# Patient Record
Sex: FEMALE | Race: WHITE | NOT HISPANIC OR LATINO | Employment: UNEMPLOYED | URBAN - METROPOLITAN AREA
[De-identification: names, ages, dates, MRNs, and addresses within clinical notes are randomized per-mention and may not be internally consistent; named-entity substitution may affect disease eponyms.]

---

## 2017-01-23 ENCOUNTER — TELEPHONE (OUTPATIENT)
Dept: FAMILY MEDICINE | Facility: CLINIC | Age: 62
End: 2017-01-23

## 2017-01-23 NOTE — TELEPHONE ENCOUNTER
I don't thinks we should use an unavailable slot for this problem, I can see her when I have an available appointment

## 2017-01-23 NOTE — TELEPHONE ENCOUNTER
Reason for Call:  Other call back    Detailed comments:  Viktor baltazar, had discussed patient getting a power wheelchair for patient at previous appointment would like to speak with Dr Espinal    Phone Number Patient can be reached at: Home number on file [phone] (home)    Best Time: any    Can we leave a detailed message on this number? YES    Call taken on 1/23/2017 at 8:49 AM by Marlin Zacarias

## 2017-01-23 NOTE — TELEPHONE ENCOUNTER
Call back to spouse, Viktor. C2C on file.  Viktor states he contacted Anabela regarding power chair for patient.  Viktor states would need an OV along with order for chair.  Per Viktor, states he has discussed this with provider.  Viktor states he will be in town with patient from 1/27-2/8 if provider needs to see patient.  Informed would send to provider to advise on order and OV.  Provider please review. Are you able to see patient during dates above to assess for power chair?  If so, please advise when and any other actions needed.  Patricia Burton RN

## 2017-03-24 ENCOUNTER — TELEPHONE (OUTPATIENT)
Dept: FAMILY MEDICINE | Facility: CLINIC | Age: 62
End: 2017-03-24

## 2017-03-24 NOTE — TELEPHONE ENCOUNTER
Reason for Call:  Other appointment    Detailed comments:   Viktor calling, patient fell and hit her head and lost consciousness last night  was in ER at [facility]. SHe is home now and  is supposed to be seen today per ER.     Please advise.    Phone Number Patient can be reached at: Other phone number:  [phone] Viktor    Best Time: asap    Can we leave a detailed message on this number? YES    Call taken on 3/24/2017 at 8:56 AM by Veda Huston

## 2017-03-24 NOTE — TELEPHONE ENCOUNTER
"PCP: XENA    Last evening Patient was going to sit down at the kitchen table chair that has rollers on it, she missed the chair and \"flopped to the side\" and hit her head on the baseboard near the floor.  Was knocked unconscious, taken by ambulance to [facility].  CT scan and \"heart\" testing done. Everything came back normal.  Her blood sugar was noted to be low at 60, and her sodium was low (number unknown)   The  states that she got an abrasion on the right temple area \"that bled a lot\", no stitches needed.  States that she has a big \"knob\" on her head.    States that no IV fluids were given, no other treatment done.     The  states that his wife is doing very well. \"looks well.\"    He states that the ER wanted to keep her overnight, but the Patient wanted to go home.  They were advised to be seen today. \"I don't think she needs to be seen today, she looks great.\"     The  states that she had eaten well for dinner last night.     Plan: Scheduled with PCP for Monday 3/27.  Advised to have Patient seen in ER this weekend if symptoms worsen or change.  Advised to call the nurseline over the weekend if they have any other questions or concerns.  The  is comfortable with this plan.     Glenny Juan RN  "

## 2017-03-24 NOTE — TELEPHONE ENCOUNTER
To PCP:  Please see below.  Can you see patient?  We have no other openings today.  Thank you.  Megan Dooley RN

## 2017-03-24 NOTE — TELEPHONE ENCOUNTER
If she feels well she can be seen here on Monday or sometime next wee, if she feels badly or Jabier is concerned then returning to ER is appropriate

## 2017-08-03 DIAGNOSIS — E03.9 HYPOTHYROIDISM, UNSPECIFIED TYPE: ICD-10-CM

## 2017-08-03 RX ORDER — LEVOTHYROXINE SODIUM 25 UG/1
TABLET ORAL
Qty: 90 TABLET | Refills: 0 | Status: SHIPPED | OUTPATIENT
Start: 2017-08-03 | End: 2017-10-04

## 2017-08-03 NOTE — TELEPHONE ENCOUNTER
Routing refill request to provider for review/approval because:  Labs not current:  TSH  Please review refill.  Thank you.  Megan Dooley RN

## 2017-08-03 NOTE — TELEPHONE ENCOUNTER
levothyroxine (SYNTHROID/LEVOTHROID) 25 MCG tablet       Last Written Prescription Date: 1/5/2017  Last Quantity: 90, # refills: 1  Last Office Visit with [facility], [facility] or [facility] prescribing provider: 12/2/2016        TSH   Date Value Ref Range Status   03/25/2016 1.35 0.40 - 4.00 mU/L Final

## 2017-09-03 ENCOUNTER — HEALTH MAINTENANCE LETTER (OUTPATIENT)
Age: 62
End: 2017-09-03

## 2017-10-04 ENCOUNTER — TELEPHONE (OUTPATIENT)
Dept: FAMILY MEDICINE | Facility: CLINIC | Age: 62
End: 2017-10-04

## 2017-10-04 DIAGNOSIS — E87.1 HYPONATREMIA: ICD-10-CM

## 2017-10-04 DIAGNOSIS — E03.9 HYPOTHYROIDISM, UNSPECIFIED TYPE: ICD-10-CM

## 2017-10-04 RX ORDER — LEVOTHYROXINE SODIUM 25 UG/1
TABLET ORAL
Qty: 90 TABLET | Refills: 0 | Status: SHIPPED | OUTPATIENT
Start: 2017-10-04 | End: 2018-03-06

## 2017-10-04 RX ORDER — SODIUM CHLORIDE 1 G/1
TABLET ORAL
Qty: 270 TABLET | Refills: 0 | Status: SHIPPED | OUTPATIENT
Start: 2017-10-04 | End: 2017-12-26

## 2017-10-04 NOTE — TELEPHONE ENCOUNTER
sodium chloride 1 GM tablet     Last Written Prescription Date: 3/20/2017  Last Fill Quantity: 270,  # refills: 3   Last Office Visit with [facility], [facility] or [facility] prescribing provider: 12/2/2016                                             levothyroxine (SYNTHROID/LEVOTHROID) 25 MCG tablet      Last Written Prescription Date: 8/3/2017  Last Quantity: 90, # refills: 0  Last Office Visit with [facility], [facility] or [facility] prescribing provider: 12/2/2016        TSH   Date Value Ref Range Status   03/25/2016 1.35 0.40 - 4.00 mU/L Final

## 2017-10-04 NOTE — TELEPHONE ENCOUNTER
Routing refill request to provider for review/approval because:  Labs not current:  TSH.  Please review refills.  Thank you.  Megan Dooley RN

## 2017-10-04 NOTE — TELEPHONE ENCOUNTER
PHS (Pap/Colon/Mammo) - Completed. Per patient, completed all screenings with normal results. Patient does not remember when they were completed.    Outreach ,  Flora Barnard

## 2017-12-26 DIAGNOSIS — E87.1 HYPONATREMIA: ICD-10-CM

## 2017-12-28 RX ORDER — SODIUM CHLORIDE 1 G/1
TABLET ORAL
Qty: 270 TABLET | Refills: 0 | Status: SHIPPED | OUTPATIENT
Start: 2017-12-28

## 2017-12-28 NOTE — TELEPHONE ENCOUNTER
Routing refill request to provider for review/approval because:  Drug not on the [facility] refill protocol   Last ov 12/2/16 - due for OV  Erma Farley RN        Requested Prescriptions   Pending Prescriptions Disp Refills     sodium chloride 1 GM tablet [Pharmacy Med Name: SODIUM CHLORIDE 1GM TABLETS] 270 tablet 0     Sig: TAKE 1 TABLET BY MOUTH THREE TIMES DAILY TO REPLACE SALT    There is no refill protocol information for this order

## 2018-01-02 ENCOUNTER — AMBULATORY - HEALTHEAST (OUTPATIENT)
Dept: GERIATRICS | Facility: CLINIC | Age: 63
End: 2018-01-02

## 2018-01-03 ENCOUNTER — OFFICE VISIT - HEALTHEAST (OUTPATIENT)
Dept: GERIATRICS | Facility: CLINIC | Age: 63
End: 2018-01-03

## 2018-01-03 DIAGNOSIS — Z98.890 S/P ORIF (OPEN REDUCTION INTERNAL FIXATION) FRACTURE: ICD-10-CM

## 2018-01-03 DIAGNOSIS — Z87.81 S/P ORIF (OPEN REDUCTION INTERNAL FIXATION) FRACTURE: ICD-10-CM
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
## 2018-01-03 DIAGNOSIS — E87.6 HYPOKALEMIA: ICD-10-CM

## 2018-01-03 DIAGNOSIS — I10 ESSENTIAL HYPERTENSION: ICD-10-CM

## 2018-01-03 DIAGNOSIS — K59.03 DRUG-INDUCED CONSTIPATION: ICD-10-CM

## 2018-01-03 DIAGNOSIS — E03.9 ACQUIRED HYPOTHYROIDISM: ICD-10-CM

## 2018-01-03 DIAGNOSIS — E85.4 CEREBRAL AMYLOID ANGIOPATHY (H): ICD-10-CM

## 2018-01-03 DIAGNOSIS — R63.0 ANOREXIA: ICD-10-CM

## 2018-01-03 DIAGNOSIS — E87.1 HYPONATREMIA: ICD-10-CM

## 2018-01-03 DIAGNOSIS — E44.0 MODERATE PROTEIN-CALORIE MALNUTRITION (H): ICD-10-CM

## 2018-01-03 DIAGNOSIS — E83.42 HYPOMAGNESEMIA: ICD-10-CM

## 2018-01-03 DIAGNOSIS — S42.402D CLOSED FRACTURE OF LEFT ELBOW WITH ROUTINE HEALING: ICD-10-CM

## 2018-01-03 DIAGNOSIS — Z86.73 H/O: CVA (CEREBROVASCULAR ACCIDENT): ICD-10-CM

## 2018-01-03 DIAGNOSIS — I68.0 CEREBRAL AMYLOID ANGIOPATHY (H): ICD-10-CM

## 2018-01-03 DIAGNOSIS — S42.002D CLOSED DISPLACED FRACTURE OF LEFT CLAVICLE WITH ROUTINE HEALING, UNSPECIFIED PART OF CLAVICLE, SUBSEQUENT ENCOUNTER: ICD-10-CM

## 2018-01-04 ENCOUNTER — RECORDS - HEALTHEAST (OUTPATIENT)
Dept: LAB | Facility: CLINIC | Age: 63
End: 2018-01-04

## 2018-01-04 ENCOUNTER — OFFICE VISIT - HEALTHEAST (OUTPATIENT)
Dept: GERIATRICS | Facility: CLINIC | Age: 63
End: 2018-01-04

## 2018-01-04 DIAGNOSIS — Z87.81 S/P ORIF (OPEN REDUCTION INTERNAL FIXATION) FRACTURE: ICD-10-CM

## 2018-01-04 DIAGNOSIS — M81.0 OSTEOPOROSIS: ICD-10-CM

## 2018-01-04 DIAGNOSIS — S42.002D CLOSED DISPLACED FRACTURE OF LEFT CLAVICLE WITH ROUTINE HEALING, UNSPECIFIED PART OF CLAVICLE, SUBSEQUENT ENCOUNTER: ICD-10-CM

## 2018-01-04 DIAGNOSIS — Z86.73 H/O: CVA (CEREBROVASCULAR ACCIDENT): ICD-10-CM

## 2018-01-04 DIAGNOSIS — Z98.890 S/P ORIF (OPEN REDUCTION INTERNAL FIXATION) FRACTURE: ICD-10-CM

## 2018-01-04 DIAGNOSIS — I10 ESSENTIAL HYPERTENSION: ICD-10-CM

## 2018-01-04 DIAGNOSIS — S42.402D CLOSED FRACTURE OF LEFT ELBOW WITH ROUTINE HEALING: ICD-10-CM

## 2018-01-04 DIAGNOSIS — E87.1 HYPONATREMIA: ICD-10-CM

## 2018-01-04 LAB
ANION GAP SERPL CALCULATED.3IONS-SCNC: 6 MMOL/L (ref 5–18)
BASOPHILS # BLD AUTO: 0 THOU/UL (ref 0–0.2)
BASOPHILS NFR BLD AUTO: 1 % (ref 0–2)
BUN SERPL-MCNC: 12 MG/DL (ref 8–22)
CALCIUM SERPL-MCNC: 8.5 MG/DL (ref 8.5–10.5)
CHLORIDE BLD-SCNC: 101 MMOL/L (ref 98–107)
CO2 SERPL-SCNC: 24 MMOL/L (ref 22–31)
CREAT SERPL-MCNC: 0.5 MG/DL (ref 0.6–1.1)
EOSINOPHIL # BLD AUTO: 0.1 THOU/UL (ref 0–0.4)
EOSINOPHIL NFR BLD AUTO: 1 % (ref 0–6)
ERYTHROCYTE [DISTWIDTH] IN BLOOD BY AUTOMATED COUNT: 13.6 % (ref 11–14.5)
GFR SERPL CREATININE-BSD FRML MDRD: >60 ML/MIN/1.73M2
GLUCOSE BLD-MCNC: 86 MG/DL (ref 70–125)
HCT VFR BLD AUTO: 35.9 % (ref 35–47)
HGB BLD-MCNC: 11.9 G/DL (ref 12–16)
LYMPHOCYTES # BLD AUTO: 1 THOU/UL (ref 0.8–4.4)
LYMPHOCYTES NFR BLD AUTO: 11 % (ref 20–40)
MAGNESIUM SERPL-MCNC: 1.9 MG/DL (ref 1.8–2.6)
MCH RBC QN AUTO: 31.9 PG (ref 27–34)
MCHC RBC AUTO-ENTMCNC: 33.1 G/DL (ref 32–36)
MCV RBC AUTO: 96 FL (ref 80–100)
MONOCYTES # BLD AUTO: 0.7 THOU/UL (ref 0–0.9)
MONOCYTES NFR BLD AUTO: 8 % (ref 2–10)
NEUTROPHILS # BLD AUTO: 6.4 THOU/UL (ref 2–7.7)
NEUTROPHILS NFR BLD AUTO: 79 % (ref 50–70)
PLATELET # BLD AUTO: 431 THOU/UL (ref 140–440)
PMV BLD AUTO: 10 FL (ref 8.5–12.5)
POTASSIUM BLD-SCNC: 4.4 MMOL/L (ref 3.5–5)
RBC # BLD AUTO: 3.73 MILL/UL (ref 3.8–5.4)
SODIUM SERPL-SCNC: 131 MMOL/L (ref 136–145)
WBC: 8.5 THOU/UL (ref 4–11)

## 2018-01-05 ENCOUNTER — AMBULATORY - HEALTHEAST (OUTPATIENT)
Dept: ADMINISTRATIVE | Facility: CLINIC | Age: 63
End: 2018-01-05

## 2018-01-09 ENCOUNTER — OFFICE VISIT - HEALTHEAST (OUTPATIENT)
Dept: GERIATRICS | Facility: CLINIC | Age: 63
End: 2018-01-09

## 2018-01-09 ENCOUNTER — RECORDS - HEALTHEAST (OUTPATIENT)
Dept: LAB | Facility: CLINIC | Age: 63
End: 2018-01-09

## 2018-01-09 DIAGNOSIS — S42.402D CLOSED FRACTURE OF LEFT ELBOW WITH ROUTINE HEALING: ICD-10-CM

## 2018-01-09 DIAGNOSIS — I10 ESSENTIAL HYPERTENSION: ICD-10-CM

## 2018-01-09 DIAGNOSIS — E87.1 HYPONATREMIA: ICD-10-CM

## 2018-01-09 DIAGNOSIS — I68.0 CEREBRAL AMYLOID ANGIOPATHY (H): ICD-10-CM

## 2018-01-09 DIAGNOSIS — R63.0 ANOREXIA: ICD-10-CM
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
## 2018-01-09 DIAGNOSIS — E44.0 MODERATE PROTEIN-CALORIE MALNUTRITION (H): ICD-10-CM

## 2018-01-09 DIAGNOSIS — E03.9 ACQUIRED HYPOTHYROIDISM: ICD-10-CM

## 2018-01-09 DIAGNOSIS — E85.4 CEREBRAL AMYLOID ANGIOPATHY (H): ICD-10-CM

## 2018-01-09 DIAGNOSIS — E83.42 HYPOMAGNESEMIA: ICD-10-CM

## 2018-01-09 DIAGNOSIS — S42.002D CLOSED DISPLACED FRACTURE OF LEFT CLAVICLE WITH ROUTINE HEALING, UNSPECIFIED PART OF CLAVICLE, SUBSEQUENT ENCOUNTER: ICD-10-CM

## 2018-01-09 DIAGNOSIS — R53.81 PHYSICAL DECONDITIONING: ICD-10-CM

## 2018-01-09 DIAGNOSIS — Z86.73 H/O: CVA (CEREBROVASCULAR ACCIDENT): ICD-10-CM

## 2018-01-09 LAB
ALBUMIN UR-MCNC: NEGATIVE MG/DL
APPEARANCE UR: CLEAR
BILIRUB UR QL STRIP: NEGATIVE
COLOR UR AUTO: YELLOW
GLUCOSE UR STRIP-MCNC: NEGATIVE MG/DL
HGB UR QL STRIP: NEGATIVE
KETONES UR STRIP-MCNC: NEGATIVE MG/DL
LEUKOCYTE ESTERASE UR QL STRIP: NEGATIVE
NITRATE UR QL: NEGATIVE
PH UR STRIP: 7.5 [PH] (ref 4.5–8)
SP GR UR STRIP: 1.01 (ref 1–1.03)
UROBILINOGEN UR STRIP-ACNC: NORMAL

## 2018-01-10 ENCOUNTER — RECORDS - HEALTHEAST (OUTPATIENT)
Dept: LAB | Facility: CLINIC | Age: 63
End: 2018-01-10

## 2018-01-10 LAB — BACTERIA SPEC CULT: NO GROWTH

## 2018-01-11 ENCOUNTER — AMBULATORY - HEALTHEAST (OUTPATIENT)
Dept: GERIATRICS | Facility: CLINIC | Age: 63
End: 2018-01-11

## 2018-01-11 ENCOUNTER — OFFICE VISIT - HEALTHEAST (OUTPATIENT)
Dept: GERIATRICS | Facility: CLINIC | Age: 63
End: 2018-01-11

## 2018-01-11 DIAGNOSIS — R63.0 ANOREXIA: ICD-10-CM

## 2018-01-11 DIAGNOSIS — S42.002D CLOSED DISPLACED FRACTURE OF LEFT CLAVICLE WITH ROUTINE HEALING, UNSPECIFIED PART OF CLAVICLE, SUBSEQUENT ENCOUNTER: ICD-10-CM

## 2018-01-11 DIAGNOSIS — I68.0 CEREBRAL AMYLOID ANGIOPATHY (H): ICD-10-CM

## 2018-01-11 DIAGNOSIS — K59.03 DRUG-INDUCED CONSTIPATION: ICD-10-CM

## 2018-01-11 DIAGNOSIS — Z86.73 H/O: CVA (CEREBROVASCULAR ACCIDENT): ICD-10-CM

## 2018-01-11 DIAGNOSIS — E83.42 HYPOMAGNESEMIA: ICD-10-CM

## 2018-01-11 DIAGNOSIS — Z87.81 S/P ORIF (OPEN REDUCTION INTERNAL FIXATION) FRACTURE: ICD-10-CM

## 2018-01-11 DIAGNOSIS — S42.402D CLOSED FRACTURE OF LEFT ELBOW WITH ROUTINE HEALING: ICD-10-CM

## 2018-01-11 DIAGNOSIS — I10 ESSENTIAL HYPERTENSION: ICD-10-CM

## 2018-01-11 DIAGNOSIS — Z98.890 S/P ORIF (OPEN REDUCTION INTERNAL FIXATION) FRACTURE: ICD-10-CM

## 2018-01-11 DIAGNOSIS — E85.4 CEREBRAL AMYLOID ANGIOPATHY (H): ICD-10-CM

## 2018-01-11 DIAGNOSIS — R53.81 PHYSICAL DECONDITIONING: ICD-10-CM

## 2018-01-11 DIAGNOSIS — E87.1 HYPONATREMIA: ICD-10-CM

## 2018-01-11 LAB
25(OH)D3 SERPL-MCNC: 57.5 NG/ML (ref 30–80)
ANION GAP SERPL CALCULATED.3IONS-SCNC: 5 MMOL/L (ref 5–18)
BUN SERPL-MCNC: 16 MG/DL (ref 8–22)
CALCIUM SERPL-MCNC: 8.2 MG/DL (ref 8.5–10.5)
CHLORIDE BLD-SCNC: 102 MMOL/L (ref 98–107)
CO2 SERPL-SCNC: 24 MMOL/L (ref 22–31)
CREAT SERPL-MCNC: 0.59 MG/DL (ref 0.6–1.1)
GFR SERPL CREATININE-BSD FRML MDRD: >60 ML/MIN/1.73M2
GLUCOSE BLD-MCNC: 76 MG/DL (ref 70–125)
MAGNESIUM SERPL-MCNC: 1.8 MG/DL (ref 1.8–2.6)
POTASSIUM BLD-SCNC: 4.4 MMOL/L (ref 3.5–5)
SODIUM SERPL-SCNC: 131 MMOL/L (ref 136–145)
TSH SERPL DL<=0.005 MIU/L-ACNC: 1.36 UIU/ML (ref 0.3–5)

## 2018-01-16 ENCOUNTER — OFFICE VISIT - HEALTHEAST (OUTPATIENT)
Dept: GERIATRICS | Facility: CLINIC | Age: 63
End: 2018-01-16

## 2018-01-16 DIAGNOSIS — Z86.73 H/O: CVA (CEREBROVASCULAR ACCIDENT): ICD-10-CM

## 2018-01-16 DIAGNOSIS — S42.002D CLOSED DISPLACED FRACTURE OF LEFT CLAVICLE WITH ROUTINE HEALING, UNSPECIFIED PART OF CLAVICLE, SUBSEQUENT ENCOUNTER: ICD-10-CM

## 2018-01-16 DIAGNOSIS — I10 HTN (HYPERTENSION): ICD-10-CM

## 2018-01-16 DIAGNOSIS — S42.402D CLOSED FRACTURE OF LEFT ELBOW WITH ROUTINE HEALING: ICD-10-CM

## 2018-01-17 ENCOUNTER — RECORDS - HEALTHEAST (OUTPATIENT)
Dept: LAB | Facility: CLINIC | Age: 63
End: 2018-01-17

## 2018-01-18 ENCOUNTER — TRANSFERRED RECORDS (OUTPATIENT)
Dept: HEALTH INFORMATION MANAGEMENT | Facility: CLINIC | Age: 63
End: 2018-01-18

## 2018-01-18 ENCOUNTER — OFFICE VISIT - HEALTHEAST (OUTPATIENT)
Dept: GERIATRICS | Facility: CLINIC | Age: 63
End: 2018-01-18

## 2018-01-18 DIAGNOSIS — E87.1 HYPONATREMIA: ICD-10-CM

## 2018-01-18 DIAGNOSIS — R53.81 PHYSICAL DECONDITIONING: ICD-10-CM

## 2018-01-18 DIAGNOSIS — Z86.73 H/O: CVA (CEREBROVASCULAR ACCIDENT): ICD-10-CM

## 2018-01-18 DIAGNOSIS — Z98.890 S/P ORIF (OPEN REDUCTION INTERNAL FIXATION) FRACTURE: ICD-10-CM

## 2018-01-18 DIAGNOSIS — S42.002D CLOSED DISPLACED FRACTURE OF LEFT CLAVICLE WITH ROUTINE HEALING, UNSPECIFIED PART OF CLAVICLE, SUBSEQUENT ENCOUNTER: ICD-10-CM

## 2018-01-18 DIAGNOSIS — S42.402D CLOSED FRACTURE OF LEFT ELBOW WITH ROUTINE HEALING: ICD-10-CM

## 2018-01-18 DIAGNOSIS — Z87.81 S/P ORIF (OPEN REDUCTION INTERNAL FIXATION) FRACTURE: ICD-10-CM

## 2018-01-18 LAB
ANION GAP SERPL CALCULATED.3IONS-SCNC: 5 MMOL/L (ref 5–18)
BUN SERPL-MCNC: 16 MG/DL (ref 8–22)
CALCIUM SERPL-MCNC: 8.6 MG/DL (ref 8.5–10.5)
CHLORIDE BLD-SCNC: 99 MMOL/L (ref 98–107)
CO2 SERPL-SCNC: 25 MMOL/L (ref 22–31)
CREAT SERPL-MCNC: 0.61 MG/DL (ref 0.6–1.1)
CREAT SERPL-MCNC: 0.61 MG/DL (ref 0.6–1.1)
GFR SERPL CREATININE-BSD FRML MDRD: >60 ML/MIN/1.73M2
GFR SERPL CREATININE-BSD FRML MDRD: >60 ML/MIN/1.73M2
GLUCOSE BLD-MCNC: 86 MG/DL (ref 70–125)
GLUCOSE SERPL-MCNC: 76 MG/DL (ref 70–125)
POTASSIUM BLD-SCNC: 4.4 MMOL/L (ref 3.5–5)
POTASSIUM SERPL-SCNC: 4.4 MMOL/L (ref 3.5–5)
SODIUM SERPL-SCNC: 129 MMOL/L (ref 136–145)

## 2018-01-22 ENCOUNTER — RECORDS - HEALTHEAST (OUTPATIENT)
Dept: LAB | Facility: CLINIC | Age: 63
End: 2018-01-22

## 2018-01-23 ENCOUNTER — OFFICE VISIT - HEALTHEAST (OUTPATIENT)
Dept: GERIATRICS | Facility: CLINIC | Age: 63
End: 2018-01-23

## 2018-01-23 DIAGNOSIS — E03.9 ACQUIRED HYPOTHYROIDISM: ICD-10-CM

## 2018-01-23 DIAGNOSIS — E85.4 CEREBRAL AMYLOID ANGIOPATHY (H): ICD-10-CM

## 2018-01-23 DIAGNOSIS — I10 ESSENTIAL HYPERTENSION: ICD-10-CM

## 2018-01-23 DIAGNOSIS — E87.1 HYPONATREMIA: ICD-10-CM

## 2018-01-23 DIAGNOSIS — K59.03 DRUG-INDUCED CONSTIPATION: ICD-10-CM

## 2018-01-23 DIAGNOSIS — R63.0 ANOREXIA: ICD-10-CM

## 2018-01-23 DIAGNOSIS — R53.81 PHYSICAL DECONDITIONING: ICD-10-CM

## 2018-01-23 DIAGNOSIS — Z86.73 H/O: CVA (CEREBROVASCULAR ACCIDENT): ICD-10-CM

## 2018-01-23 DIAGNOSIS — I68.0 CEREBRAL AMYLOID ANGIOPATHY (H): ICD-10-CM

## 2018-01-23 DIAGNOSIS — E83.42 HYPOMAGNESEMIA: ICD-10-CM

## 2018-01-23 LAB
ANION GAP SERPL CALCULATED.3IONS-SCNC: 5 MMOL/L (ref 5–18)
BUN SERPL-MCNC: 14 MG/DL (ref 8–22)
CALCIUM SERPL-MCNC: 8.7 MG/DL (ref 8.5–10.5)
CHLORIDE BLD-SCNC: 98 MMOL/L (ref 98–107)
CO2 SERPL-SCNC: 26 MMOL/L (ref 22–31)
CREAT SERPL-MCNC: 0.61 MG/DL (ref 0.6–1.1)
GFR SERPL CREATININE-BSD FRML MDRD: >60 ML/MIN/1.73M2
GLUCOSE BLD-MCNC: 78 MG/DL (ref 70–125)
POTASSIUM BLD-SCNC: 4.6 MMOL/L (ref 3.5–5)
SODIUM SERPL-SCNC: 129 MMOL/L (ref 136–145)

## 2018-01-26 ENCOUNTER — OFFICE VISIT - HEALTHEAST (OUTPATIENT)
Dept: GERIATRICS | Facility: CLINIC | Age: 63
End: 2018-01-26

## 2018-01-26 DIAGNOSIS — Z87.81 S/P ORIF (OPEN REDUCTION INTERNAL FIXATION) FRACTURE: ICD-10-CM

## 2018-01-26 DIAGNOSIS — Z98.890 S/P ORIF (OPEN REDUCTION INTERNAL FIXATION) FRACTURE: ICD-10-CM

## 2018-01-26 DIAGNOSIS — I68.0 CEREBRAL AMYLOID ANGIOPATHY (H): ICD-10-CM

## 2018-01-26 DIAGNOSIS — E85.4 CEREBRAL AMYLOID ANGIOPATHY (H): ICD-10-CM

## 2018-01-26 DIAGNOSIS — R53.81 PHYSICAL DECONDITIONING: ICD-10-CM

## 2018-01-26 DIAGNOSIS — E83.42 HYPOMAGNESEMIA: ICD-10-CM

## 2018-01-26 DIAGNOSIS — E87.1 HYPONATREMIA: ICD-10-CM
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
## 2018-01-26 DIAGNOSIS — Z86.73 H/O: CVA (CEREBROVASCULAR ACCIDENT): ICD-10-CM

## 2018-01-26 DIAGNOSIS — I10 ESSENTIAL HYPERTENSION: ICD-10-CM

## 2018-01-26 DIAGNOSIS — S42.002D CLOSED DISPLACED FRACTURE OF LEFT CLAVICLE WITH ROUTINE HEALING, UNSPECIFIED PART OF CLAVICLE, SUBSEQUENT ENCOUNTER: ICD-10-CM

## 2018-01-26 DIAGNOSIS — E44.0 MODERATE PROTEIN-CALORIE MALNUTRITION (H): ICD-10-CM

## 2018-01-26 DIAGNOSIS — S42.402D CLOSED FRACTURE OF LEFT ELBOW WITH ROUTINE HEALING: ICD-10-CM

## 2018-01-30 ENCOUNTER — AMBULATORY - HEALTHEAST (OUTPATIENT)
Dept: GERIATRICS | Facility: CLINIC | Age: 63
End: 2018-01-30

## 2018-01-30 ENCOUNTER — OFFICE VISIT - HEALTHEAST (OUTPATIENT)
Dept: GERIATRICS | Facility: CLINIC | Age: 63
End: 2018-01-30

## 2018-01-30 ENCOUNTER — TRANSFERRED RECORDS (OUTPATIENT)
Dept: HEALTH INFORMATION MANAGEMENT | Facility: CLINIC | Age: 63
End: 2018-01-30

## 2018-01-30 DIAGNOSIS — I68.0 CEREBRAL AMYLOID ANGIOPATHY (H): ICD-10-CM

## 2018-01-30 DIAGNOSIS — S42.402D CLOSED FRACTURE OF LEFT ELBOW WITH ROUTINE HEALING: ICD-10-CM

## 2018-01-30 DIAGNOSIS — S42.002D CLOSED DISPLACED FRACTURE OF LEFT CLAVICLE WITH ROUTINE HEALING, UNSPECIFIED PART OF CLAVICLE, SUBSEQUENT ENCOUNTER: ICD-10-CM

## 2018-01-30 DIAGNOSIS — E85.4 CEREBRAL AMYLOID ANGIOPATHY (H): ICD-10-CM

## 2018-01-30 DIAGNOSIS — K59.03 DRUG-INDUCED CONSTIPATION: ICD-10-CM

## 2018-01-30 DIAGNOSIS — I10 ESSENTIAL HYPERTENSION: ICD-10-CM

## 2018-01-30 DIAGNOSIS — E03.9 ACQUIRED HYPOTHYROIDISM: ICD-10-CM

## 2018-03-06 DIAGNOSIS — E03.9 HYPOTHYROIDISM, UNSPECIFIED TYPE: ICD-10-CM

## 2018-03-06 NOTE — TELEPHONE ENCOUNTER
"Leveothyroxine  Last Written Prescription Date:  10/04/2017  Last Fill Quantity: 90,  # refills: 0   Last office visit: 12/2/2016 with prescribing provider:  12/02/2016   Future Office Visit:    Requested Prescriptions   Pending Prescriptions Disp Refills     levothyroxine (SYNTHROID/LEVOTHROID) 25 MCG tablet [Pharmacy Med Name: LEVOTHYROXINE 0.025MG (25MCG) TAB] 90 tablet 0     Sig: TAKE 1 TABLET BY MOUTH ONCE DAILY FOR THYROID HORMONE REPLACEMENT    Thyroid Protocol Failed    3/6/2018  3:56 PM       Failed - Recent (12 mo) or future (30 days) visit within the authorizing provider's specialty    Patient had office visit in the last year or has a visit in the next 30 days with authorizing provider.  See \"Patient Info\" tab in inbasket, or \"Choose Columns\" in Meds & Orders section of the refill encounter.            Failed - Normal TSH on file in past 12 months    Recent Labs   Lab Test  03/25/16   1011   TSH  1.35             Passed - Patient is 12 years or older       Passed - No active pregnancy on record    If patient is pregnant or has had a positive pregnancy test, please check TSH.         Passed - No positive pregnancy test in past 12 months    If patient is pregnant or has had a positive pregnancy test, please check TSH.            "

## 2018-03-07 RX ORDER — LEVOTHYROXINE SODIUM 25 UG/1
TABLET ORAL
Qty: 30 TABLET | Refills: 0 | Status: SHIPPED | OUTPATIENT
Start: 2018-03-07

## 2018-03-07 NOTE — TELEPHONE ENCOUNTER
Medication is being filled for 1 time refill only due to:  Overdue for office visit and labs.     Heena Krishnan, GUILHERME, RN, PHN

## 2018-05-16 DIAGNOSIS — E03.9 HYPOTHYROIDISM, UNSPECIFIED TYPE: ICD-10-CM

## 2018-05-16 RX ORDER — LEVOTHYROXINE SODIUM 25 UG/1
TABLET ORAL
Refills: 0 | OUTPATIENT
Start: 2018-05-16

## 2019-02-18 DIAGNOSIS — E87.1 HYPONATREMIA: ICD-10-CM

## 2019-02-19 NOTE — TELEPHONE ENCOUNTER
sodium chloride 1 GM tablet  Last Written Prescription Date:  12/28/17  Last Fill Quantity: 270,  # refills: 0   Last office visit: 12/2/2016 with prescribing provider:  Kylie    Future Office Visit:          Requested Prescriptions   Pending Prescriptions Disp Refills     sodium chloride 1 GM tablet [Pharmacy Med Name: SODIUM CHLORIDE 1GM TABLETS] 270 tablet 0     Sig: TAKE 1 TABLET BY MOUTH THREE TIMES DAILY TO REPLACE SALT.    There is no refill protocol information for this order

## 2019-02-20 RX ORDER — SODIUM CHLORIDE 1 G/1
TABLET ORAL
Qty: 270 TABLET | Refills: 0 | OUTPATIENT
Start: 2019-02-20

## 2019-02-20 NOTE — TELEPHONE ENCOUNTER
Denied.  Patient no longer under provider care.  See note from 5/3/2018.  Patient needs to be seen because it has been more than 2 years since last office visit (12/2016).    EMILY Dang, RN  [employer] [facility]

## 2019-04-08 ENCOUNTER — ANESTHESIA - HEALTHEAST (OUTPATIENT)
Dept: SURGERY | Facility: CLINIC | Age: 64
End: 2019-04-08

## 2019-04-08 ASSESSMENT — MIFFLIN-ST. JEOR
SCORE: 945.05
SCORE: 945.05

## 2019-04-09 ENCOUNTER — SURGERY - HEALTHEAST (OUTPATIENT)
Dept: SURGERY | Facility: CLINIC | Age: 64
End: 2019-04-09

## 2019-04-09 ASSESSMENT — MIFFLIN-ST. JEOR: SCORE: 923.73

## 2019-04-10 ASSESSMENT — MIFFLIN-ST. JEOR: SCORE: 922.37

## 2019-04-11 ASSESSMENT — MIFFLIN-ST. JEOR: SCORE: 922.37

## 2019-09-19 ENCOUNTER — SURGERY - HEALTHEAST (OUTPATIENT)
Dept: SURGERY | Facility: HOSPITAL | Age: 64
End: 2019-09-19

## 2019-09-19 ENCOUNTER — ANESTHESIA - HEALTHEAST (OUTPATIENT)
Dept: SURGERY | Facility: HOSPITAL | Age: 64
End: 2019-09-19

## 2021-05-27 ENCOUNTER — RECORDS - HEALTHEAST (OUTPATIENT)
Dept: ADMINISTRATIVE | Facility: CLINIC | Age: 66
End: 2021-05-27

## 2021-05-27 NOTE — ANESTHESIA PREPROCEDURE EVALUATION
Anesthesia Evaluation      Patient summary reviewed     Airway   Mallampati: II   Pulmonary    (+) decreased breath sounds,                          Cardiovascular   (+) hypertension, ,     Rhythm: regular  Rate: normal,         Neuro/Psych    (+) CVA ,     Endo/Other    (+) hypothyroidism,      GI/Hepatic/Renal       Other findings:     Fall  Closed displaced fracture of left clavicle  Closed fracture of left elbow with routine healing  S/P ORIF (open reduction internal fixation) fracture of clavicle and elbow  Cerebral amyloid angiopathy (H)  H/O: CVA (cerebrovascular accident)  Hemiparesis (H)  Essential hypertension  Acquired hypothyroidism  Hyponatremia  Hypomagnesemia  Constipation  Anorexia  Moderate protein-calorie malnutrition (H)  Physical deconditioning  Closed fracture of right proximal tibia          Dental - normal exam                        Anesthesia Plan  Planned anesthetic: spinal  SAB  MAP TARGET 65-75  ASA 3     Anesthetic plan and risks discussed with: patient  Anesthesia plan special considerations: antiemetics,   Post-op plan: routine recovery

## 2021-05-27 NOTE — ANESTHESIA CARE TRANSFER NOTE
Last vitals:   Vitals:    04/09/19 0940   BP:    Pulse: 60   Resp: 22   Temp: 37  C (98.6  F)   SpO2: 100%     Patient's level of consciousness is drowsy  Spontaneous respirations: yes  Maintains airway independently: yes  Dentition unchanged: yes  Oropharynx: oropharynx clear of all foreign objects    QCDR Measures:  ASA# 20 - Surgical Safety Checklist: WHO surgical safety checklist completed prior to induction    PQRS# 430 - Adult PONV Prevention: 4558F - Pt received => 2 anti-emetic agents (different classes) preop & intraop  ASA# 8 - Peds PONV Prevention: NA - Not pediatric patient, not GA or 2 or more risk factors NOT present  PQRS# 424 - Amy-op Temp Management: 4559F - At least one body temp DOCUMENTED => 35.5C or 95.9F within required timeframe  PQRS# 426 - PACU Transfer Protocol: - Transfer of care checklist used  ASA# 14 - Acute Post-op Pain: ASA14B - Patient did NOT experience pain >= 7 out of 10

## 2021-05-27 NOTE — ANESTHESIA POSTPROCEDURE EVALUATION
Patient: Jennifer Ayala  OPEN REDUCTION INTERNAL FIXATION, FRACTURE, TIBIAL PLATEAU  Anesthesia type: No value filed.    Patient location: PACU  Last vitals:   Vitals Value Taken Time   /75 4/9/2019 11:30 AM   Temp 36.7  C (98  F) 4/9/2019 11:30 AM   Pulse 60 4/9/2019 11:30 AM   Resp 16 4/9/2019 11:30 AM   SpO2 97 % 4/9/2019 11:30 AM     Post vital signs: stable  Level of consciousness: awake and responds to simple questions  Post-anesthesia pain: pain controlled  Post-anesthesia nausea and vomiting: no  Pulmonary: unassisted, return to baseline  Cardiovascular: stable and blood pressure at baseline  Hydration: adequate  Anesthetic events: no    QCDR Measures:  ASA# 11 - Amy-op Cardiac Arrest: ASA11B - Patient did NOT experience unanticipated cardiac arrest  ASA# 12 - Amy-op Mortality Rate: ASA12B - Patient did NOT die  ASA# 13 - PACU Re-Intubation Rate: NA - No ETT / LMA used for case  ASA# 10 - Composite Anes Safety: ASA10A - No serious adverse event    Additional Notes:

## 2021-05-28 ENCOUNTER — RECORDS - HEALTHEAST (OUTPATIENT)
Dept: ADMINISTRATIVE | Facility: CLINIC | Age: 66
End: 2021-05-28

## 2021-05-31 VITALS — BODY MASS INDEX: 16.3 KG/M2 | WEIGHT: 92 LBS

## 2021-06-01 NOTE — ANESTHESIA POSTPROCEDURE EVALUATION
Patient: Jennifer Ayala  OPEN REDUCTION INTERNAL FIXATION RIGHT OLECRANON FRACTURE  Anesthesia type: general    Patient location: PACU  Last vitals:   Vitals Value Taken Time   /78 9/19/2019  5:53 PM   Temp 36.3  C (97.3  F) 9/19/2019  5:25 PM   Pulse 54 9/19/2019  5:54 PM   Resp 16 9/19/2019  5:50 PM   SpO2 98 % 9/19/2019  5:54 PM   Vitals shown include unvalidated device data.  Post vital signs: stable  Level of consciousness: awake and responds to simple questions  Post-anesthesia pain: pain controlled  Post-anesthesia nausea and vomiting: no  Pulmonary: unassisted, return to baseline  Cardiovascular: stable and blood pressure at baseline  Hydration: adequate  Anesthetic events: no    QCDR Measures:  ASA# 11 - Amy-op Cardiac Arrest: ASA11B - Patient did NOT experience unanticipated cardiac arrest  ASA# 12 - Amy-op Mortality Rate: ASA12B - Patient did NOT die  ASA# 13 - PACU Re-Intubation Rate: ASA13B - Patient did NOT require a new airway mgmt  ASA# 10 - Composite Anes Safety: ASA10A - No serious adverse event    Additional Notes:

## 2021-06-01 NOTE — ANESTHESIA CARE TRANSFER NOTE
Last vitals:   Vitals:    09/19/19 1725   BP: 126/79   Pulse: (!) 52   Resp: 14   Temp: 36.3  C (97.3  F)   SpO2: 100%     Patient's level of consciousness is drowsy  Spontaneous respirations: yes  Maintains airway independently: yes  Dentition unchanged: yes  Oropharynx: oropharynx clear of all foreign objects    QCDR Measures:  ASA# 20 - Surgical Safety Checklist: WHO surgical safety checklist completed prior to induction    PQRS# 430 - Adult PONV Prevention: 4558F - Pt received => 2 anti-emetic agents (different classes) preop & intraop  ASA# 8 - Peds PONV Prevention: NA - Not pediatric patient, not GA or 2 or more risk factors NOT present  PQRS# 424 - Amy-op Temp Management: 4559F - At least one body temp DOCUMENTED => 35.5C or 95.9F within required timeframe  PQRS# 426 - PACU Transfer Protocol: - Transfer of care checklist used  ASA# 14 - Acute Post-op Pain: ASA14B - Patient did NOT experience pain >= 7 out of 10

## 2021-06-01 NOTE — ANESTHESIA PROCEDURE NOTES
Peripheral Block    Patient location during procedure: pre-op  Start time: 9/19/2019 1:42 PM  End time: 9/19/2019 1:47 PM  post-op analgesia per surgeon order as noted in medical record  Staffing:  Performing  Anesthesiologist: Darrell Ferrer MD  Preanesthetic Checklist  Completed: patient identified, site marked, risks, benefits, and alternatives discussed, timeout performed, consent obtained, airway assessed, oxygen available, suction available, emergency drugs available and hand hygiene performed  Peripheral Block  Block type: brachial plexus, supraclavicular  Prep: ChloraPrep  Patient position: supine  Patient monitoring: cardiac monitor, continuous pulse oximetry, blood pressure and heart rate  Laterality: right  Injection technique: ultrasound guided    Ultrasound used to visualize needle placement in proximity to nerve being blocked: yes   Permanent ultrasound image captured for medical record  Sterile gel and probe cover used for ultrasound.    Needle  Needle type: echogenic ([place])   Needle gauge: 20G  Needle length: 4 in  no peripheral nerve catheter placed  Assessment  Injection assessment: no difficulty with injection, negative aspiration for heme, no paresthesia on injection and incremental injection

## 2021-06-01 NOTE — ANESTHESIA PREPROCEDURE EVALUATION
Anesthesia Evaluation      Patient summary reviewed   No history of anesthetic complications     Airway   Mallampati: II  Neck ROM: full   Pulmonary - negative ROS and normal exam    breath sounds clear to auscultation                         Cardiovascular   Exercise tolerance: > or = 4 METS  (+) hypertension, , hypercholesterolemia,     (-) murmur  ECG reviewed (9/16/19: NSR, prolonged QT, 69 bpm)  Rhythm: regular  Rate: normal,    no murmur      Neuro/Psych    (+) CVA (w/ R hemiparesis) ,     Comments: Cerebral amyloid angiopathy      Endo/Other    (+) hypothyroidism,      GI/Hepatic/Renal - negative ROS      Other findings: Labs 9/16/19: Hgb 12.5 Right sided hemiparesis  Dysarthria       Dental - normal exam                        Anesthesia Plan  Planned anesthetic: peripheral nerve block, total IV anesthesia and general LMA  LMA 3 w/  TIVA w/ propofol gtt.  Preop supraclavicular brachial plexus PNB for postop analgesia per surgeon request.  Decadron and zofran for PONV ppx.    ASA 3   Induction: intravenous   Anesthetic plan and risks discussed with: patient  Anesthesia plan special considerations: antiemetics,   Post-op plan: routine recovery

## 2021-06-02 VITALS — BODY MASS INDEX: 15.95 KG/M2 | HEIGHT: 63 IN | WEIGHT: 90 LBS

## 2021-06-03 VITALS — BODY MASS INDEX: 16.29 KG/M2 | WEIGHT: 91.93 LBS

## 2021-06-15 NOTE — PROGRESS NOTES
Name:  Jennifer Ayala  :  1955  MRN: [id number]  Code Status:  FULL CODE    Visit Type: Review Of Multiple Medical Conditions     Facility:  [facility] [[id number]]  Facility Type: SNF (Skilled Nursing Facility, TCU)    History of Present Illness:   This is a 62 year old female here following a fall with multiple injuries. She sustained a contusion of her skull, closed nondisplaced fracture of the acromial end of the right clavicle, and left elbow fracture. She did have orif of left elbow and clavicle. Staff has no reports of any issues or concerns with surgical site. She does have a history of CVA with right sided weakness- but is able to ambulate in therapy with assist.She does have a hx of hyponatremia with labs and orders noted below. Staff reported some lower blood pressures and are requesting orders (parameters) on when to hold her B/P medications. See orders below.    Past Medical History:   Diagnosis Date     Anemia      Cerebral amyloid angiopathy      Closed displaced fracture of lateral end of right clavicle      Elbow fracture, left      HLD (hyperlipidemia)      Hypertension      Hypomagnesemia      Hyponatremia      Hypothyroidism      Influenza A      Osteoporosis      Right hemiparesis      Stroke      Tuberculosis      Past Surgical History:   Procedure Laterality Date     ORIF CLAVICLE FRACTURE Left 2017     ORIF HUMERUS FRACTURE Left 2017     OTHER SURGICAL HISTORY Right     removal of right knee bursa      Family History   Problem Relation Age of Onset     Diabetes Father      Heart disease Mother      Osteoporosis Mother      Diabetes Brother      Social History   Substance Use Topics     Smoking status: Never Smoker     Smokeless tobacco: Never Used     Alcohol use 4.2 - 8.4 oz/week     7 - 14 Glasses of wine per week       Medications:  Current Outpatient Prescriptions   Medication Sig Dispense Refill     acetaminophen (TYLENOL) 500 MG tablet Take  500-1,000 mg by mouth every 6 (six) hours as needed for pain.       amLODIPine (NORVASC) 2.5 MG tablet Take 2.5 mg by mouth daily.       aspirin 81 MG EC tablet Take 81 mg by mouth daily.       calcium carbonate-vitamin D2 500 mg(1,250mg) -200 unit tablet Take 1 tablet by mouth 2 (two) times a day.       cholecalciferol, vitamin D3, 1,000 unit tablet Take 1,000 Units by mouth daily.       levothyroxine (SYNTHROID, LEVOTHROID) 25 MCG tablet Take 25 mcg by mouth daily.       lisinopril (PRINIVIL,ZESTRIL) 40 MG tablet Take 40 mg by mouth daily.       oxyCODONE (OXY-IR) 5 mg capsule Take 5 mg by mouth every 6 (six) hours as needed.       senna-docusate (PERICOLACE) 8.6-50 mg tablet Take 2 tablets by mouth 2 (two) times a day as needed for constipation.       sodium chloride 1 gram tablet Take 1 g by mouth 2 (two) times a day.       No current facility-administered medications for this visit.          Allergies   Allergen Reactions     Other Drug Allergy (See Comments)      Anticoagulant Compound         Vital Signs:   B/P 88302  P  56  R  16  WT 93    ROS:     Resident denies any uncontrolled pain, nausea, vomiting, dizziness headache, shortness of breath, vision bowel or bladder problems, night sweats fever chills chest pain abdominal pain. No dysuria hematuria frequency urgency constipation loose stools. Swallows okay eats okay.     PHYSICAL EXAM:  General: this is an alert frail female up in her room in NAD  HEENT:Normocephalic/atraumatic Conjunctivae without erythema nares are clear of any drainage.  Neck: No adenopathy JVD thyromegaly  Resp: Clear No rhonchi wheezing rales  Cardio: Regular rate and rhythm No murmurs appreciated  Abdomen: Soft nontender no masses distention bowel sounds are present.  Extremities: No lower extremity edema fair peripheral pulses--left arm sling in place with good CMS -- there is right hemiparesis noted.  Skin: Skin intact No noted skin issues--noted or reported  :  N/A  Musculoskeletal: Age-related degenerative joint disease  Psych: alert conversive    Labs:   Lab Requisition on 01/11/2018   Component Date Value Ref Range Status     Sodium 01/11/2018 131* 136 - 145 mmol/L Final     Potassium 01/11/2018 4.4  3.5 - 5.0 mmol/L Final     Chloride 01/11/2018 102  98 - 107 mmol/L Final     CO2 01/11/2018 24  22 - 31 mmol/L Final     Anion Gap, Calculation 01/11/2018 5  5 - 18 mmol/L Final     Glucose 01/11/2018 76  70 - 125 mg/dL Final     Calcium 01/11/2018 8.2* 8.5 - 10.5 mg/dL Final     BUN 01/11/2018 16  8 - 22 mg/dL Final     Creatinine 01/11/2018 0.59* 0.60 - 1.10 mg/dL Final     GFR MDRD Af Amer 01/11/2018 >60  >60 mL/min/1.73m2 Final     GFR MDRD Non Af Amer 01/11/2018 >60  >60 mL/min/1.73m2 Final     Magnesium 01/11/2018 1.8  1.8 - 2.6 mg/dL Final     TSH 01/11/2018 1.36  0.30 - 5.00 uIU/mL Final     Vitamin D, Total (25-Hydroxy) 01/11/2018 57.5  30.0 - 80.0 ng/mL Final   Lab Requisition on 01/09/2018   Component Date Value Ref Range Status     Color, UA 01/09/2018 Yellow  Colorless, Yellow, Straw, Light Yellow Final     Clarity, UA 01/09/2018 Clear  Clear Final     Glucose, UA 01/09/2018 Negative  Negative Final     Bilirubin, UA 01/09/2018 Negative  Negative Final     Ketones, UA 01/09/2018 Negative  Negative Final     Specific Gravity, UA 01/09/2018 1.008  1.001 - 1.030 Final     Blood, UA 01/09/2018 Negative  Negative Final     pH, UA 01/09/2018 7.5  4.5 - 8.0 Final     Protein, UA 01/09/2018 Negative  Negative mg/dL Final     Urobilinogen, UA 01/09/2018 <2.0 E.U./dL  <2.0 E.U./dL, 2.0 E.U./dL Final     Nitrite, UA 01/09/2018 Negative  Negative Final     Leukocytes, UA 01/09/2018 Negative  Negative Final     Culture 01/09/2018 No Growth   Final   Lab Requisition on 01/04/2018   Component Date Value Ref Range Status     Sodium 01/04/2018 131* 136 - 145 mmol/L Final     Potassium 01/04/2018 4.4  3.5 - 5.0 mmol/L Final     Chloride 01/04/2018 101  98 - 107 mmol/L Final      CO2 01/04/2018 24  22 - 31 mmol/L Final     Anion Gap, Calculation 01/04/2018 6  5 - 18 mmol/L Final     Glucose 01/04/2018 86  70 - 125 mg/dL Final     Calcium 01/04/2018 8.5  8.5 - 10.5 mg/dL Final     BUN 01/04/2018 12  8 - 22 mg/dL Final     Creatinine 01/04/2018 0.50* 0.60 - 1.10 mg/dL Final     GFR MDRD Af Amer 01/04/2018 >60  >60 mL/min/1.73m2 Final     GFR MDRD Non Af Amer 01/04/2018 >60  >60 mL/min/1.73m2 Final     Magnesium 01/04/2018 1.9  1.8 - 2.6 mg/dL Final     WBC 01/04/2018 8.5  4.0 - 11.0 thou/uL Final     RBC 01/04/2018 3.73* 3.80 - 5.40 mill/uL Final     Hemoglobin 01/04/2018 11.9* 12.0 - 16.0 g/dL Final     Hematocrit 01/04/2018 35.9  35.0 - 47.0 % Final     MCV 01/04/2018 96  80 - 100 fL Final     MCH 01/04/2018 31.9  27.0 - 34.0 pg Final     MCHC 01/04/2018 33.1  32.0 - 36.0 g/dL Final     RDW 01/04/2018 13.6  11.0 - 14.5 % Final     Platelets 01/04/2018 431  140 - 440 thou/uL Final     MPV 01/04/2018 10.0  8.5 - 12.5 fL Final     Neutrophils % 01/04/2018 79* 50 - 70 % Final     Lymphocytes % 01/04/2018 11* 20 - 40 % Final     Monocytes % 01/04/2018 8  2 - 10 % Final     Eosinophils % 01/04/2018 1  0 - 6 % Final     Basophils % 01/04/2018 1  0 - 2 % Final     Neutrophils Absolute 01/04/2018 6.4  2.0 - 7.7 thou/uL Final     Lymphocytes Absolute 01/04/2018 1.0  0.8 - 4.4 thou/uL Final     Monocytes Absolute 01/04/2018 0.7  0.0 - 0.9 thou/uL Final     Eosinophils Absolute 01/04/2018 0.1  0.0 - 0.4 thou/uL Final     Basophils Absolute 01/04/2018 0.0  0.0 - 0.2 thou/uL Final        Diagnoses:    ICD-10-CM    1. Closed fracture of left elbow with routine healing S42.402D    2. Closed displaced fracture of left clavicle with routine healing, unspecified part of clavicle, subsequent encounter S42.002D    3. Hemiparesis of right dominant side due to cerebral infarction I63.9     G81.91    4. H/O: CVA (cerebrovascular accident) Z86.73    5. HTN (hypertension) I10      6) Hyponatremia      Plan: Hold B/p  medication with systolic < 100 ...check Na Friday -We will otherwise continue with current care plan patient does have a F/U appointment with her surgeon  Feb 6th. There is a question of discharge but no plans on admission to another facility as of yet. Her community physician wanted an update on her discharge- I did leave a message with his nurse on her current status and the phone number to the  for updates on discharge planning.     Electronically signed by: Aline Fischer CNP

## 2021-06-15 NOTE — PROGRESS NOTES
[facility]    DATE: 1/3/2018    NAME:  Jennifer Ayala             :  1955  MRN: [id number]  CODE STATUS:  FULL CODE    VISIT TYPE: Review Of Multiple Medical Conditions     FACILITY:  [facility] [[id number]]       CHIEF COMPLAIN/REASON FOR VISIT:    Chief Complaint   Patient presents with     Review Of Multiple Medical Conditions               HISTORY OF PRESENT ILLNESS: Jennifer Ayala is a 62 y.o. female who was admitted to [facility] on  for Fall down stairs, contusion of scalp, closed displaced fracture of acromial end of right clavicle, and left elbow fracture. She underwent ORIF of Left elbow and Left clavicle. On admission she was noted to have influenza A and completed tamiflu. Symptoms and precautions resolved prior to discharge. She was also treated for hypomagnesemia, hyponatremia during her stay. She has PMH of CVA with Right sided weakness, Hypothyroid, HTN. Prior to this she lived at home with her .     Today Ms. Ayala is seen with her  at bedside. She says she is not having much pain at all and does not thinks he has asked for any pain medicine since being here. Her  adds that she has a very high pain tolerance and does not even need pain medication for dental procedures. Ms. Ayala states that the swelling has gone down in her fingers and she has no numbness/tingling in her left arm or hand. She says the dressing on her shoulder was changed prior to discharge from the hospital yesterday. She had a BM last evening and it was soft. She has a good appetite and denies any cold symptoms including cough/sob. She says she feels good and does not have any swelling in her feet/legs. Her  states someone is coming from [facility] either today or tomorrow to fit her for a removable left arm splint. Ms. Ayala and her  state that she had a cough and respiratory symptoms when she fell and at the hospital she was  positive for influenza A but she completed the treatment and has not had symptoms for several days now. She denies fevers, chills, sob, or cough. Per nursing appetite has been fair, weight was 92lbs yesterday on admission.     REVIEW OF SYSTEMS:  PROBLEMS AND REVIEW OF SYSTEMS:   Review of Systems    Currently, no fever, chills, or rigors. Does not have any visual or hearing problems. Denies any chest pain, headaches, palpitations, lightheadedness, dizziness, shortness of breath, or cough. Appetite is good. Denies any GERD symptoms. Denies any difficulty with swallowing, nausea, or vomiting.  Denies any abdominal pain, diarrhea or constipation. No insomnia. No active bleeding. No rash. Positive for minimal edema Left arm/hand, Right sided weakness. Positive for urinary incontinence.       No Known Allergies  No current outpatient prescriptions on file.     Past Medical History:    Past Medical History:   Diagnosis Date     Hypertension      Stroke            PHYSICAL EXAMINATION  Vitals:    01/02/18 2103   BP: 103/76   Pulse: 78   Resp: 16   Temp: 97.9  F (36.6  C)   SpO2: 95%   Weight: (!) 92 lb (41.7 kg)           GENERAL: Awake, Alert, oriented x3, cachectic, not in any form of acute distress, answers questions appropriately, follows simple commands, conversant  HEENT: Head is normocephalic with normal hair distribution. No evidence of trauma. Ears: No acute purulent discharge. Eyes: Conjunctivae pink with no scleral jaundice. Nose: Normal mucosa and septum. NECK: Supple with no cervical or supraclavicular lymphadenopathy. Trachea is midline.   CHEST: No tenderness or deformity, no crepitus  LUNG: Dim to auscultation with good chest expansion. There are no crackles or wheezes, normal AP diameter.  BACK: No kyphosis of the thoracic spine. Symmetric, no curvature, ROM normal, no CVA tenderness, no spinal tenderness   CVS: There is good S1  S2, regular rhythm, there are no murmurs, rubs, gallops, or heaves,  2+  pulses symmetric in all extremities.  ABDOMEN: concave and soft, nontender to palpation, non distended, no masses, no organomegaly, good bowel sounds, no rebound or guarding, no peritoneal signs.   EXTREMITIES: No pedal edema, LUE sling in place, dressing over clavicle and elbow c/d/i, no drainage noted, minimal edema to shoulder, elbow, trace edema in Right hand, no numb/tingling, cap refill <2 sec;   BLE no cyanosis or clubbing, no calf tenderness.    SKIN: Warm and dry, no erythema noted.  Small contusion on scalp, scabbed and healing.   NEUROLOGICAL: The patient is oriented to person, place and time. No facial droop noted, delayed verbal response, Right sided hemiparesis            LABS:   No results found for this or any previous visit (from the past 168 hour(s)).  No results found for this or any previous visit.      No results found for: WBC, HGB, HCT, MCV, PLT    No results found for: [id number]  No results found for: HGBA1C  No results found for: INR, PROTIME  No results found for: [id number]  No results found for: TSH        ASSESSMENT/PLAN:    1. Fall, clavicle and elbow fracture, s/p ORIF: Incision clavicle c/d/i, incision elbow c/d/i. Sling in place, remains NWB LUE. [facility] to fit for removable arm splint today or tomorrow. PT, OT. F/u with ortho 1/8.   2. Cerebral Amyloid angiopathy, h/o CVA: Right sided hemiparesis, delayed verbal responses. Next f/u with neurology in April. On aspirin.   3. HTN: SBP 100s. On amlodipine, lisinopril. Hold parameters.   4. Hypothyroid: On levothyroxine.  5. Anorexia, malnutrition: Ordered dietary consult. Wt 92lbs.   6. Hyponatremia: On NaCl supplement, recheck 1/4. Na 131 on 12/31.   7. IDALMIS: Cr 0.46 on 12/31, appears resolved. Recheck bmp 1/4.   8. Hypomagnesemia: Mg 1.5 on 12/31. On mag ox. Recheck 1/4.   9. Hypokalemia: K 3.2 on 12/31. No potassium supplement with dc orders, will recheck 1/4.   10. Constipation: Senna-docusate prn.         Electronically signed  by: Romina Narvaez    Total 35 minutes of which 75% was spent in counseling and coordination of care of the above plan    Time spent in interview and examination of patient, review of available records, and discussion with nursing staff. Continue care plan, efforts at therapy, and monitor nutritional status.

## 2021-06-15 NOTE — PROGRESS NOTES
[facility]        Visit Type: H & P    Code Status:  POLST AVAILABLE  Facility:  [facility] [[id number]]          PCP: Cash Espinal MD       PHONE: [phone]     FAX:[phone]        ASSESSMENT/PLAN:  1. Closed displaced fracture of left clavicle with routine healing, unspecified part of clavicle, subsequent encounter   will get PT/OT involved.  We will continue with oxycodone 5 mg every 6 hours as needed for pain management   2. Closed fracture of left elbow with routine healing   as above   3. S/P ORIF (open reduction internal fixation) fracture of clavicle and elbow   above   4. Hyponatremia   recent sodium is at 131, potassium now normal at 4.4 from 3.2.  Continue sodium chloride tablets, check BMP   5. H/O: CVA (cerebrovascular accident)   continue aspirin, good blood pressure control   6. Essential hypertension   stable on amlodipine and lisinopril.  Patient had prerenal azotemia in the hospital but now creatinine is normal at 0.5 with a GFR of greater than 60.  Will recheck BMP next week now that the patient is back on lisinopril   7. Osteoporosis   will check TSH and vitamin D levels         HISTORY OF PRESENT ILLNESS:   Jennifer Ayala is a 62 y.o. female with a history of chronic hyponatremia, hypertension, history of cerebral amyloid angiopathy, CVA with right hemiparesis who fell down her stairs and sustained a left distal clavicular and left distal humeral fracture.  She underwent left clavicular ORIF on 12/27/17 and left humeral ORIF on 12/29/17.  Postoperatively, she did well.  While recuperating in the hospital, she was found to have influenza A and was given Tamiflu ×5 days.  She was also noted to have prerenal azotemia and her lisinopril was held.  Amlodipine was continued for her hypertension.  She was sent to our facility for further rehab.    Currently, the patient states that she is doing slightly better and that  she does not have any pain on her surgical sites.  She states that she is eating okay but not sleeping well.  She denies any nausea or vomiting, abdominal pain, constipation or diarrhea.  She states that she is not dizzy or lightheaded.  She states that she still has some occasional cough but denies any fevers or chills, shortness of breath, chest pains.  She is quite frustrated for having fallen since she does have right hemiparesis and now feels frustrated that she is confined to her bed until she gets stronger.  At home she uses a walker, able to hobble around in her own home on her own.    Review of systems are negative.    PAST MEDICAL/SURGICAL HISTORY:  Past Medical History:   Diagnosis Date     Anemia      Cerebral amyloid angiopathy      Closed displaced fracture of lateral end of right clavicle      Elbow fracture, left      HLD (hyperlipidemia)      Hypertension      Hypomagnesemia      Hyponatremia      Hypothyroidism      Influenza A      Osteoporosis      Right hemiparesis      Stroke      Tuberculosis      Past Surgical History:   Procedure Laterality Date     ORIF CLAVICLE FRACTURE Left 12/29/2017     ORIF HUMERUS FRACTURE Left 12/27/2017     OTHER SURGICAL HISTORY Right     removal of right knee bursa        SOCIAL HISTORY:  Social History     Social History     Marital status:      Spouse name: N/A     Number of children: N/A     Years of education: N/A     Occupational History     Not on file.     Social History Main Topics     Smoking status: Never Smoker     Smokeless tobacco: Never Used     Alcohol use 4.2 - 8.4 oz/week     7 - 14 Glasses of wine per week     Drug use: Not on file     Sexual activity: Not on file     Other Topics Concern     Not on file     Social History Narrative       FAMILY HISTORY:  Family History   Problem Relation Age of Onset     Diabetes Father      Heart disease Mother      Osteoporosis Mother      Diabetes Brother    Father also had CVA    MEDICATIONS:  Current  Outpatient Prescriptions on File Prior to Visit   Medication Sig     amLODIPine (NORVASC) 2.5 MG tablet Take 2.5 mg by mouth daily.     aspirin 81 MG EC tablet Take 81 mg by mouth daily.     calcium carbonate-vitamin D2 500 mg(1,250mg) -200 unit tablet Take 1 tablet by mouth 2 (two) times a day.     cholecalciferol, vitamin D3, 1,000 unit tablet Take 1,000 Units by mouth daily.     levothyroxine (SYNTHROID, LEVOTHROID) 25 MCG tablet Take 25 mcg by mouth daily.     lisinopril (PRINIVIL,ZESTRIL) 40 MG tablet Take 40 mg by mouth daily.     magnesium oxide (MAG-OX) 400 mg tablet Take 400 mg by mouth 2 (two) times a day.     oxyCODONE (OXY-IR) 5 mg capsule Take 5 mg by mouth every 6 (six) hours as needed.     senna-docusate (PERICOLACE) 8.6-50 mg tablet Take 2 tablets by mouth 2 (two) times a day as needed for constipation.     sodium chloride 1 gram tablet Take 2 g by mouth every morning. 2 g every afternoon and 1 g every evening     No current facility-administered medications on file prior to visit.        ALLERGIES:  Allergies   Allergen Reactions     Other Drug Allergy (See Comments)      Anticoagulant Compound           PHYSICAL EXAMINATION:  Vital signs 114/71, 97.9, 81, 18, 95% room air, 92.2 pounds  General: Awake, Alert, oriented x3, not in any form of acute distress, answers questions appropriately, follows simple commands, conversant, with slurred speech, thin and frail  HEENT: Pink conjunctiva, anicteric sclerae, oral mucosa is moist, shallow left nasolabial fold  NECK: Supple, without any lymphadenopathy, thyromegaly or any masses  LUNG: Clear to auscultation, good chest expansion. There are no crackles, no wheezes, normal AP diameter  BACK: No kyphosis of the thoracic spine  CVS: There is good S1  S2, there are no murmurs, no heaves, rhythm is regular  ABDOMEN: Globular and soft, nontender to palpation, no organomegaly, good bowel sounds  EXTREMITIES: Right hemiparesis with right hand contractures, right  lower extremity 2-3/5 motors, left clavicular site with ecchymoses but surgical site is clean, glued no discharge, left arm and hand in the splint and sling but with good movement of left hand, warm to touch, no pedal edema, no cyanosis or clubbing, no calf tenderness  SKIN: Warm and dry, no rashes or erythema noted    LABS:  Lab Results   Component Value Date    WBC 8.5 01/04/2018    HGB 11.9 (L) 01/04/2018    HCT 35.9 01/04/2018    MCV 96 01/04/2018     01/04/2018     Lab Results   Component Value Date    CREATININE 0.50 (L) 01/04/2018    BUN 12 01/04/2018     (L) 01/04/2018    K 4.4 01/04/2018     01/04/2018    CO2 24 01/04/2018           >45 minutes of total time spent, greater than 55% of the time spent in coordination of care and counseling regarding the above medical issues and plan of care. I have reviewed the patient's medical records, labs and medications.       Electronically signed by:Veda Nayak MD

## 2021-06-15 NOTE — PROGRESS NOTES
Name:  Jennifer Ayala  :  1955  MRN: [id number]  Code Status:  FULL CODE    Visit Type: Review Of Multiple Medical Conditions     Facility:  [facility]  Facility Type: SNF (Skilled Nursing Facility, TCU)    History of Present Illness:   This is a 62 year old female here following a fall with multiple injuries. She sustained a contusion of her skull, closed nondisplaced fracture of the acromial end of the right clavicle, and left elbow fracture. She did have orif of left elbow and clavicle. Staff has no reports of any issues or concerns with surgical site. She does have a history of CVA with right sided weakness- but is able to ambulate in therapy with assist.She does have a hx of hyponatremia with labs and orders noted below. She does have a noted decrease in her sodium level as of today.    Past Medical History:   Diagnosis Date     Anemia      Cerebral amyloid angiopathy      Closed displaced fracture of lateral end of right clavicle      Elbow fracture, left      HLD (hyperlipidemia)      Hypertension      Hypomagnesemia      Hyponatremia      Hypothyroidism      Influenza A      Osteoporosis      Right hemiparesis      Stroke      Tuberculosis      Past Surgical History:   Procedure Laterality Date     ORIF CLAVICLE FRACTURE Left 2017     ORIF HUMERUS FRACTURE Left 2017     OTHER SURGICAL HISTORY Right     removal of right knee bursa      Family History   Problem Relation Age of Onset     Diabetes Father      Heart disease Mother      Osteoporosis Mother      Diabetes Brother      Social History   Substance Use Topics     Smoking status: Never Smoker     Smokeless tobacco: Never Used     Alcohol use 4.2 - 8.4 oz/week     7 - 14 Glasses of wine per week       Medications:  Current Outpatient Prescriptions   Medication Sig Dispense Refill     acetaminophen (TYLENOL) 500 MG tablet Take 500-1,000 mg by mouth every 6 (six) hours as needed for pain.       amLODIPine (NORVASC) 2.5  MG tablet Take 2.5 mg by mouth daily.       aspirin 81 MG EC tablet Take 81 mg by mouth daily.       calcium carbonate-vitamin D2 500 mg(1,250mg) -200 unit tablet Take 1 tablet by mouth 2 (two) times a day.       cholecalciferol, vitamin D3, 1,000 unit tablet Take 1,000 Units by mouth daily.       levothyroxine (SYNTHROID, LEVOTHROID) 25 MCG tablet Take 25 mcg by mouth daily.       lisinopril (PRINIVIL,ZESTRIL) 40 MG tablet Take 40 mg by mouth daily.       oxyCODONE (OXY-IR) 5 mg capsule Take 5 mg by mouth every 6 (six) hours as needed.       senna-docusate (PERICOLACE) 8.6-50 mg tablet Take 2 tablets by mouth 2 (two) times a day as needed for constipation.       sodium chloride 1 gram tablet Take 1 g by mouth 2 (two) times a day.       No current facility-administered medications for this visit.          Allergies   Allergen Reactions     Other Drug Allergy (See Comments)      Anticoagulant Compound         Vital Signs:   B/P 131/65   P  66  R  18  WT 92.8---93 pds 2 days ago    ROS:     Resident denies any uncontrolled pain, nausea, vomiting, dizziness headache, shortness of breath, vision bowel or bladder problems, night sweats fever chills chest pain abdominal pain. No dysuria hematuria frequency urgency constipation loose stools. Swallows okay eats okay.     PHYSICAL EXAM:  General: this is an alert frail female up in her room in NAD  HEENT:Normocephalic/atraumatic Conjunctivae without erythema nares are clear of any drainage.  Neck: No adenopathy JVD thyromegaly  Resp: Clear No rhonchi wheezing rales  Cardio: Regular rate and rhythm No murmurs appreciated  Abdomen: Soft nontender no masses distention bowel sounds are present.  Extremities: No lower extremity edema fair peripheral pulses--left arm sling in place with good CMS -- there is right hemiparesis noted.  Skin: Skin intact No noted skin issues--noted or reported  : N/A  Musculoskeletal: Age-related degenerative joint disease  Psych: alert  conversive    Labs:   Lab Requisition on 01/18/2018   Component Date Value Ref Range Status     Sodium 01/18/2018 129* 136 - 145 mmol/L Final     Potassium 01/18/2018 4.4  3.5 - 5.0 mmol/L Final     Chloride 01/18/2018 99  98 - 107 mmol/L Final     CO2 01/18/2018 25  22 - 31 mmol/L Final     Anion Gap, Calculation 01/18/2018 5  5 - 18 mmol/L Final     Glucose 01/18/2018 86  70 - 125 mg/dL Final     Calcium 01/18/2018 8.6  8.5 - 10.5 mg/dL Final     BUN 01/18/2018 16  8 - 22 mg/dL Final     Creatinine 01/18/2018 0.61  0.60 - 1.10 mg/dL Final     GFR MDRD Af Amer 01/18/2018 >60  >60 mL/min/1.73m2 Final     GFR MDRD Non Af Amer 01/18/2018 >60  >60 mL/min/1.73m2 Final   Lab Requisition on 01/11/2018   Component Date Value Ref Range Status     Sodium 01/11/2018 131* 136 - 145 mmol/L Final     Potassium 01/11/2018 4.4  3.5 - 5.0 mmol/L Final     Chloride 01/11/2018 102  98 - 107 mmol/L Final     CO2 01/11/2018 24  22 - 31 mmol/L Final     Anion Gap, Calculation 01/11/2018 5  5 - 18 mmol/L Final     Glucose 01/11/2018 76  70 - 125 mg/dL Final     Calcium 01/11/2018 8.2* 8.5 - 10.5 mg/dL Final     BUN 01/11/2018 16  8 - 22 mg/dL Final     Creatinine 01/11/2018 0.59* 0.60 - 1.10 mg/dL Final     GFR MDRD Af Amer 01/11/2018 >60  >60 mL/min/1.73m2 Final     GFR MDRD Non Af Amer 01/11/2018 >60  >60 mL/min/1.73m2 Final     Magnesium 01/11/2018 1.8  1.8 - 2.6 mg/dL Final     TSH 01/11/2018 1.36  0.30 - 5.00 uIU/mL Final     Vitamin D, Total (25-Hydroxy) 01/11/2018 57.5  30.0 - 80.0 ng/mL Final   Lab Requisition on 01/09/2018   Component Date Value Ref Range Status     Color, UA 01/09/2018 Yellow  Colorless, Yellow, Straw, Light Yellow Final     Clarity, UA 01/09/2018 Clear  Clear Final     Glucose, UA 01/09/2018 Negative  Negative Final     Bilirubin, UA 01/09/2018 Negative  Negative Final     Ketones, UA 01/09/2018 Negative  Negative Final     Specific Gravity, UA 01/09/2018 1.008  1.001 - 1.030 Final     Blood, UA 01/09/2018  Negative  Negative Final     pH, UA 01/09/2018 7.5  4.5 - 8.0 Final     Protein, UA 01/09/2018 Negative  Negative mg/dL Final     Urobilinogen, UA 01/09/2018 <2.0 E.U./dL  <2.0 E.U./dL, 2.0 E.U./dL Final     Nitrite, UA 01/09/2018 Negative  Negative Final     Leukocytes, UA 01/09/2018 Negative  Negative Final     Culture 01/09/2018 No Growth   Final   Lab Requisition on 01/04/2018   Component Date Value Ref Range Status     Sodium 01/04/2018 131* 136 - 145 mmol/L Final     Potassium 01/04/2018 4.4  3.5 - 5.0 mmol/L Final     Chloride 01/04/2018 101  98 - 107 mmol/L Final     CO2 01/04/2018 24  22 - 31 mmol/L Final     Anion Gap, Calculation 01/04/2018 6  5 - 18 mmol/L Final     Glucose 01/04/2018 86  70 - 125 mg/dL Final     Calcium 01/04/2018 8.5  8.5 - 10.5 mg/dL Final     BUN 01/04/2018 12  8 - 22 mg/dL Final     Creatinine 01/04/2018 0.50* 0.60 - 1.10 mg/dL Final     GFR MDRD Af Amer 01/04/2018 >60  >60 mL/min/1.73m2 Final     GFR MDRD Non Af Amer 01/04/2018 >60  >60 mL/min/1.73m2 Final     Magnesium 01/04/2018 1.9  1.8 - 2.6 mg/dL Final     WBC 01/04/2018 8.5  4.0 - 11.0 thou/uL Final     RBC 01/04/2018 3.73* 3.80 - 5.40 mill/uL Final     Hemoglobin 01/04/2018 11.9* 12.0 - 16.0 g/dL Final     Hematocrit 01/04/2018 35.9  35.0 - 47.0 % Final     MCV 01/04/2018 96  80 - 100 fL Final     MCH 01/04/2018 31.9  27.0 - 34.0 pg Final     MCHC 01/04/2018 33.1  32.0 - 36.0 g/dL Final     RDW 01/04/2018 13.6  11.0 - 14.5 % Final     Platelets 01/04/2018 431  140 - 440 thou/uL Final     MPV 01/04/2018 10.0  8.5 - 12.5 fL Final     Neutrophils % 01/04/2018 79* 50 - 70 % Final     Lymphocytes % 01/04/2018 11* 20 - 40 % Final     Monocytes % 01/04/2018 8  2 - 10 % Final     Eosinophils % 01/04/2018 1  0 - 6 % Final     Basophils % 01/04/2018 1  0 - 2 % Final     Neutrophils Absolute 01/04/2018 6.4  2.0 - 7.7 thou/uL Final     Lymphocytes Absolute 01/04/2018 1.0  0.8 - 4.4 thou/uL Final     Monocytes Absolute 01/04/2018 0.7   0.0 - 0.9 thou/uL Final     Eosinophils Absolute 01/04/2018 0.1  0.0 - 0.4 thou/uL Final     Basophils Absolute 01/04/2018 0.0  0.0 - 0.2 thou/uL Final        Diagnoses:    ICD-10-CM    1. Hyponatremia E87.1    2. Closed displaced fracture of left clavicle with routine healing, unspecified part of clavicle, subsequent encounter S42.002D    3. Closed fracture of left elbow with routine healing S42.402D    4. H/O: CVA (cerebrovascular accident) Z86.73    5. S/P ORIF (open reduction internal fixation) fracture of clavicle and elbow Z96.7     Z87.81    6. Hemiparesis of right dominant side due to cerebral infarction I63.9     G81.91    7. Physical deconditioning R53.81            Plan:  Increase Na 1 gram bid and recheck BMP next Tuesday and report to primary.  We will otherwise continue with current care plan and continue to monitor. Patient status appears to be stable.    Electronically signed by: Aline Fischer, CNP

## 2021-06-15 NOTE — PROGRESS NOTES
[facility]    DATE: 2018    NAME:  eJnnifer Ayala             :  1955  MRN: [id number]  CODE STATUS:  FULL CODE    VISIT TYPE: Review Of Multiple Medical Conditions     FACILITY:  [facility] [[id number]]       CHIEF COMPLAIN/REASON FOR VISIT:    Chief Complaint   Patient presents with     Review Of Multiple Medical Conditions               HISTORY OF PRESENT ILLNESS: Jennifer Ayala is a 62 y.o. female who was admitted to [facility] on  for Fall down stairs, contusion of scalp, closed displaced fracture of acromial end of right clavicle, and left elbow fracture. She underwent ORIF of Left elbow and Left clavicle. On admission she was noted to have influenza A and completed tamiflu. Symptoms and precautions resolved prior to discharge. She was also treated for hypomagnesemia, hyponatremia during her stay. She has PMH of CVA with Right sided weakness, Hypothyroid, HTN. Prior to this she lived at home with her .     Today Ms. Ayala states she is leaving today. She is a little nervous about the transition but thinks it will be good. She did enjoy being here and had no complaints regarding her stay. She still has no pain and not using pain meds. She says her bowels are moving fine and she has no concerns. She just wanted to check in before she left today. She plans to continue her medication for the peeing frequently at night as it seems to help her sleep much better.     See discharge summary completed on 18.     REVIEW OF SYSTEMS:  PROBLEMS AND REVIEW OF SYSTEMS:   Review of Systems  Today on ROS;   Currently, no fever, chills, or rigors. Does not have any visual or hearing problems. Denies any chest pain, headaches, palpitations, lightheadedness, dizziness, shortness of breath, or cough. Appetite is good. Denies any GERD symptoms. Denies any difficulty with swallowing, nausea, or vomiting.  Denies any abdominal pain, diarrhea or  constipation. No active bleeding. No rash. Positive for minimal edema Left arm/hand, Right sided weakness. Positive for urinary incontinence, nocturia.       Allergies   Allergen Reactions     Other Drug Allergy (See Comments)      Anticoagulant Compound       Current Outpatient Prescriptions   Medication Sig     acetaminophen (TYLENOL) 500 MG tablet Take 500-1,000 mg by mouth every 6 (six) hours as needed for pain.     amLODIPine (NORVASC) 2.5 MG tablet Take 2.5 mg by mouth daily.     aspirin 81 MG EC tablet Take 81 mg by mouth daily.     calcium carbonate-vitamin D2 500 mg(1,250mg) -200 unit tablet Take 1 tablet by mouth 2 (two) times a day.     cholecalciferol, vitamin D3, 1,000 unit tablet Take 1,000 Units by mouth daily.     levothyroxine (SYNTHROID, LEVOTHROID) 25 MCG tablet Take 25 mcg by mouth daily.     lisinopril (PRINIVIL,ZESTRIL) 40 MG tablet Take 40 mg by mouth daily.     oxyCODONE (OXY-IR) 5 mg capsule Take 5 mg by mouth every 6 (six) hours as needed.     senna-docusate (PERICOLACE) 8.6-50 mg tablet Take 2 tablets by mouth 2 (two) times a day as needed for constipation.     sodium chloride 1 gram tablet Take 1 g by mouth 2 (two) times a day.     Past Medical History:    Past Medical History:   Diagnosis Date     Anemia      Cerebral amyloid angiopathy      Closed displaced fracture of lateral end of right clavicle      Elbow fracture, left      HLD (hyperlipidemia)      Hypertension      Hypomagnesemia      Hyponatremia      Hypothyroidism      Influenza A      Osteoporosis      Right hemiparesis      Stroke      Tuberculosis            PHYSICAL EXAMINATION  Vitals:    01/29/18 0700   BP: 113/75   Pulse: (!) 58   Resp: 16   Temp: 97  F (36.1  C)   SpO2: 96%         Today on physical exam:   GENERAL: Awake, Alert, oriented x3, cachectic, not in any form of acute distress, answers questions appropriately, follows simple commands, conversant  HEENT: Head is normocephalic with normal hair distribution. No  evidence of trauma. Ears: No acute purulent discharge. Eyes: Conjunctivae pink with no scleral jaundice. Nose: Normal mucosa and septum. NECK: Supple with no cervical or supraclavicular lymphadenopathy. Trachea is midline.   CHEST: No tenderness or deformity, no crepitus  LUNG: Dim to auscultation with good chest expansion. There are no crackles or wheezes, normal AP diameter.  BACK: No kyphosis of the thoracic spine. Symmetric, no curvature, ROM normal, no CVA tenderness, no spinal tenderness   CVS: There is good S1  S2, regular rhythm, there are no murmurs, rubs, gallops, or heaves,  2+ pulses symmetric in all extremities.  ABDOMEN: concave and soft, nontender to palpation, non distended, no masses, no organomegaly, good bowel sounds, no rebound or guarding, no peritoneal signs.   EXTREMITIES: No pedal edema, LUE sling in place, dressing over clavicle and elbow c/d/i, no drainage noted, minimal edema to shoulder, elbow, trace edema in Right hand, no numb/tingling, cap refill <2 sec; Incision shoulder and elbow c/d/i  BLE no cyanosis or clubbing, no calf tenderness.    SKIN: Warm and dry, no erythema noted.  Small contusion on scalp, scabbed and healing.   NEUROLOGICAL: The patient is oriented to person, place and time. No facial droop noted, delayed verbal response, Right sided hemiparesis            LABS:   Recent Results (from the past 168 hour(s))   Basic Metabolic Panel   Result Value Ref Range    Sodium 129 (L) 136 - 145 mmol/L    Potassium 4.6 3.5 - 5.0 mmol/L    Chloride 98 98 - 107 mmol/L    CO2 26 22 - 31 mmol/L    Anion Gap, Calculation 5 5 - 18 mmol/L    Glucose 78 70 - 125 mg/dL    Calcium 8.7 8.5 - 10.5 mg/dL    BUN 14 8 - 22 mg/dL    Creatinine 0.61 0.60 - 1.10 mg/dL    GFR MDRD Af Amer >60 >60 mL/min/1.73m2    GFR MDRD Non Af Amer >60 >60 mL/min/1.73m2     Results for orders placed or performed in visit on 01/23/18   Basic Metabolic Panel   Result Value Ref Range    Sodium 129 (L) 136 - 145 mmol/L     Potassium 4.6 3.5 - 5.0 mmol/L    Chloride 98 98 - 107 mmol/L    CO2 26 22 - 31 mmol/L    Anion Gap, Calculation 5 5 - 18 mmol/L    Glucose 78 70 - 125 mg/dL    Calcium 8.7 8.5 - 10.5 mg/dL    BUN 14 8 - 22 mg/dL    Creatinine 0.61 0.60 - 1.10 mg/dL    GFR MDRD Af Amer >60 >60 mL/min/1.73m2    GFR MDRD Non Af Amer >60 >60 mL/min/1.73m2         Lab Results   Component Value Date    WBC 8.5 01/04/2018    HGB 11.9 (L) 01/04/2018    HCT 35.9 01/04/2018    MCV 96 01/04/2018     01/04/2018       No results found for: [id number]  No results found for: HGBA1C  No results found for: INR, PROTIME  Vitamin D, Total (25-Hydroxy)   Date Value Ref Range Status   01/11/2018 57.5 30.0 - 80.0 ng/mL Final     Lab Results   Component Value Date    TSH 1.36 01/11/2018           ASSESSMENT/PLAN:    1. Fall, clavicle and elbow fracture, s/p ORIF: Incision clavicle c/d/i, incision elbow c/d/i. Sling in place, remains NWB LUE.  PT, OT. F/u with ortho 1/8-saw in facility, ordered xrays of elbow and shoulder, all xray results reviewed and stable. She will f/u with Dr. Cota on 2/6. active ROM on Left hand, wrist, elbow, shoulder but no weight bearing still. She has removable arm splint. She has not had pain and not even asking for tylenol. Stopped oxycodone. Tylenol prn. Per spouse and pt she has very high tolerance and sensitive to narcotics. Rarely using tylenol at this time.   2. Cerebral Amyloid angiopathy, h/o CVA: Right sided hemiparesis, delayed verbal responses. Next f/u with neurology on 4/3. On aspirin.   3. HTN: SBP 140s. On amlodipine, lisinopril. Hold parameters.   4. Hypothyroid: On levothyroxine.  5. Anorexia, malnutrition: Dietary made supplement recs, following closely. Wt 92-92.8lbs.   6. Hyponatremia: On NaCl supplement, recheck 1/18 129. Recheck 1/23-Na stable at 129.   7. Hypomagnesemia: Mg 1.5 on 12/31. On mag ox. Recheck 1/4-1.9. Stable.  8. Insomnia: On melatonin qhs. Improved.  9. Constipation:  Senna-docusate prn. No current issues.    10. Overactive bladder: Nocturia, urinary urgency. UA was negative. On oxybutynin 5mg qhs. Improved more tolerable per pt.       Discharge today    Electronically signed by: Romina Nravaez NP    Total 25 minutes of which 75% was spent in counseling and coordination of care of the above plan    Time spent in interview and examination of patient, review of available records, and discussion with nursing staff. Continue care plan, efforts at therapy, and monitor nutritional status.

## 2021-06-15 NOTE — PROGRESS NOTES
[facility]    DATE: 2018    NAME:  Jennifer Ayala             :  1955  MRN: [id number]  CODE STATUS:  FULL CODE    VISIT TYPE: Discharge Summary     FACILITY:  [facility] [[id number]]       CHIEF COMPLAIN/REASON FOR VISIT:    Chief Complaint   Patient presents with     Discharge Summary               HISTORY OF PRESENT ILLNESS: Jennifer Ayala is a 62 y.o. female who was admitted to [facility] on  for Fall down stairs, contusion of scalp, closed displaced fracture of acromial end of right clavicle, and left elbow fracture. She underwent ORIF of Left elbow and Left clavicle. On admission she was noted to have influenza A and completed tamiflu. Symptoms and precautions resolved prior to discharge. She was also treated for hypomagnesemia, hyponatremia during her stay. She has PMH of CVA with Right sided weakness, Hypothyroid, HTN. Prior to this she lived at home with her .     Ms. Ayala completed therapy and has been remaining as private pay. She was limited in her progress due to previous CVA with Right sided weakness and NWB status on LUE. Her home is not handicapped accessible, therefore her  has been working on other options. He has now arranged for her to move to Assisted living apartment where she will have 24 hour care in [place] while he is having their new handicap accessible house built. She will have  PT, OT, HHA, nursing (for wound care). During her TCU stay she was treated for insomnia and overactive bladder with frequent nocturia. She was started on oxybutynin 5mg and melatonin qhs and has had much improvement with this. UA was checked prior to starting and this was negative for UTI. Her Magnesium has been stable at 1.9. Her sodium is stable at 129 even after reducing her NACl tabs to daily. Her weight remained stable at 92 lbs throughout her stay and she was encouraged to drink ensure in between meals. She did not  complain of pain in her left arm so narcotics were discontinued and she remained on tylenol prn, which she rarely used. Per her  she has a very high pain tolerance and is very sensitive to narcotics. She saw ortho on 1/8 with repeat xrays, which were all stable. She will see Dr. Cota on 2/6 and hopefully will receive some weight bearing at that time. She went out to see her primary on 1/23, so she will not need a f/u after dc. She will be discharging to the apartment on Tuesday 1/30.     REVIEW OF SYSTEMS:  PROBLEMS AND REVIEW OF SYSTEMS:   Review of Systems  Today on ROS;   Currently, no fever, chills, or rigors. Does not have any visual or hearing problems. Denies any chest pain, headaches, palpitations, lightheadedness, dizziness, shortness of breath, or cough. Appetite is good. Denies any GERD symptoms. Denies any difficulty with swallowing, nausea, or vomiting.  Denies any abdominal pain, diarrhea or constipation. No active bleeding. No rash. Positive for minimal edema Left arm/hand, Right sided weakness. Positive for urinary incontinence, nocturia.       Allergies   Allergen Reactions     Other Drug Allergy (See Comments)      Anticoagulant Compound       Current Outpatient Prescriptions   Medication Sig     acetaminophen (TYLENOL) 500 MG tablet Take 500-1,000 mg by mouth every 6 (six) hours as needed for pain.     amLODIPine (NORVASC) 2.5 MG tablet Take 2.5 mg by mouth daily.     aspirin 81 MG EC tablet Take 81 mg by mouth daily.     calcium carbonate-vitamin D2 500 mg(1,250mg) -200 unit tablet Take 1 tablet by mouth 2 (two) times a day.     cholecalciferol, vitamin D3, 1,000 unit tablet Take 1,000 Units by mouth daily.     levothyroxine (SYNTHROID, LEVOTHROID) 25 MCG tablet Take 25 mcg by mouth daily.     lisinopril (PRINIVIL,ZESTRIL) 40 MG tablet Take 40 mg by mouth daily.     oxyCODONE (OXY-IR) 5 mg capsule Take 5 mg by mouth every 6 (six) hours as needed.     senna-docusate (PERICOLACE) 8.6-50  mg tablet Take 2 tablets by mouth 2 (two) times a day as needed for constipation.     sodium chloride 1 gram tablet Take 1 g by mouth 2 (two) times a day.     Past Medical History:    Past Medical History:   Diagnosis Date     Anemia      Cerebral amyloid angiopathy      Closed displaced fracture of lateral end of right clavicle      Elbow fracture, left      HLD (hyperlipidemia)      Hypertension      Hypomagnesemia      Hyponatremia      Hypothyroidism      Influenza A      Osteoporosis      Right hemiparesis      Stroke      Tuberculosis            PHYSICAL EXAMINATION  Vitals:    01/25/18 2048   BP: 105/69   Pulse: 64   Resp: 16   Temp: 97.1  F (36.2  C)   SpO2: 98%         Today on physical exam:   GENERAL: Awake, Alert, oriented x3, cachectic, not in any form of acute distress, answers questions appropriately, follows simple commands, conversant  HEENT: Head is normocephalic with normal hair distribution. No evidence of trauma. Ears: No acute purulent discharge. Eyes: Conjunctivae pink with no scleral jaundice. Nose: Normal mucosa and septum. NECK: Supple with no cervical or supraclavicular lymphadenopathy. Trachea is midline.   CHEST: No tenderness or deformity, no crepitus  LUNG: Dim to auscultation with good chest expansion. There are no crackles or wheezes, normal AP diameter.  BACK: No kyphosis of the thoracic spine. Symmetric, no curvature, ROM normal, no CVA tenderness, no spinal tenderness   CVS: There is good S1  S2, regular rhythm, there are no murmurs, rubs, gallops, or heaves,  2+ pulses symmetric in all extremities.  ABDOMEN: concave and soft, nontender to palpation, non distended, no masses, no organomegaly, good bowel sounds, no rebound or guarding, no peritoneal signs.   EXTREMITIES: No pedal edema, LUE sling in place, dressing over clavicle and elbow c/d/i, no drainage noted, minimal edema to shoulder, elbow, trace edema in Right hand, no numb/tingling, cap refill <2 sec; Incision shoulder and  elbow c/d/i  BLE no cyanosis or clubbing, no calf tenderness.    SKIN: Warm and dry, no erythema noted.  Small contusion on scalp, scabbed and healing.   NEUROLOGICAL: The patient is oriented to person, place and time. No facial droop noted, delayed verbal response, Right sided hemiparesis            LABS:   Recent Results (from the past 168 hour(s))   Basic Metabolic Panel   Result Value Ref Range    Sodium 129 (L) 136 - 145 mmol/L    Potassium 4.6 3.5 - 5.0 mmol/L    Chloride 98 98 - 107 mmol/L    CO2 26 22 - 31 mmol/L    Anion Gap, Calculation 5 5 - 18 mmol/L    Glucose 78 70 - 125 mg/dL    Calcium 8.7 8.5 - 10.5 mg/dL    BUN 14 8 - 22 mg/dL    Creatinine 0.61 0.60 - 1.10 mg/dL    GFR MDRD Af Amer >60 >60 mL/min/1.73m2    GFR MDRD Non Af Amer >60 >60 mL/min/1.73m2     Results for orders placed or performed in visit on 01/23/18   Basic Metabolic Panel   Result Value Ref Range    Sodium 129 (L) 136 - 145 mmol/L    Potassium 4.6 3.5 - 5.0 mmol/L    Chloride 98 98 - 107 mmol/L    CO2 26 22 - 31 mmol/L    Anion Gap, Calculation 5 5 - 18 mmol/L    Glucose 78 70 - 125 mg/dL    Calcium 8.7 8.5 - 10.5 mg/dL    BUN 14 8 - 22 mg/dL    Creatinine 0.61 0.60 - 1.10 mg/dL    GFR MDRD Af Amer >60 >60 mL/min/1.73m2    GFR MDRD Non Af Amer >60 >60 mL/min/1.73m2         Lab Results   Component Value Date    WBC 8.5 01/04/2018    HGB 11.9 (L) 01/04/2018    HCT 35.9 01/04/2018    MCV 96 01/04/2018     01/04/2018       No results found for: [id number]  No results found for: HGBA1C  No results found for: INR, PROTIME  Vitamin D, Total (25-Hydroxy)   Date Value Ref Range Status   01/11/2018 57.5 30.0 - 80.0 ng/mL Final     Lab Results   Component Value Date    TSH 1.36 01/11/2018           ASSESSMENT/PLAN:    1. Fall, clavicle and elbow fracture, s/p ORIF: Incision clavicle c/d/i, incision elbow c/d/i. Sling in place, remains NWB BRUNILDA.  PT, OT. F/u with ortho 1/8-saw in facility, ordered xrays of elbow and shoulder, all xray  results reviewed and stable. She will f/u with Dr. Cota on 2/6,. active ROM on Left hand, wrist, elbow, shoulder but no weight bearing still. She now has her removable arm splint. She has not had pain and not even asking for tylenol. Stopped oxycodone. Tylenol prn. Per spouse and pt she has very high tolerance and sensitive to narcotics. Rarely using tylenol at this time. Pain remains minimal, no further issues. Will have HH therapy.   2. Cerebral Amyloid angiopathy, h/o CVA: Right sided hemiparesis, delayed verbal responses. Next f/u with neurology on 4/3. On aspirin.   3. HTN: SBP 100s. On amlodipine, lisinopril. Hold parameters.   4. Hypothyroid: On levothyroxine.  5. Anorexia, malnutrition: Dietary made supplement recs, following closely. Wt 92-92.8-92 lbs. Stable.   6. Hyponatremia: On NaCl supplement, recheck 1/18 129. Recheck 1/, reduced NaCl tabs to daily.   7. Hypomagnesemia: Mg 1.5 on 12/31. On mag ox. Recheck 1/4-1.9. Stable.  8. Insomnia: On melatonin qhs. Improved.  9. Constipation: Senna-docusate prn. No current issues.    10. Overactive bladder: Nocturia, urinary urgency. UA was negative. On oxybutynin 5mg qhs. Per patient and spouse now only getting up 1-2 times per night instead of every hour to urinate. Much improved and wish to continue after discharge.       Electronically signed by: Romina Narvaez NP    Total 40 minutes of which 75% was spent in counseling and coordination of care of the above plan    Time spent in interview and examination of patient, review of available records, and discussion with nursing staff. Continue care plan, efforts at therapy, and monitor nutritional status.        Please evaluate Jennifer Ayala for admission to Home Health.    Face to Face Attestation and Initial Plan of Care    The face-to-face encounter occurred on date: 1/26/18  Face to Face encounter was with: Romina Narvaez    Please provide brief clinical summary of reason for visit and need for home care.  "Deconditioning after hospital stay for Left clavicle and elbow fracture, weakness    Please identify which of the following home health disciplines the patient will need AND describe the skilled services that you would like the home health agency to perform: SKILLED NURSING (RN): wound care, PHYSICAL THERAPY: strength training and gait training, OCCUPATIONAL THERAPY: ADLs and home safety and HOME HEALTH AIDE    Homebound Status (describe the functional limitations that support this patient is confined to his/her home. Medicaid recipients are not required to be homebound.):patient had surgery and it is difficult to ambulate more than  50 feet    Name of physician who will be responsible for the ongoing home health plan of care (CMS requires the referring physician to provide the specific name of the community physician instead of a title, such as \"PCP\"): Cash Espinal MD    Requested Start of Care Date: Within 48 hours    Other information to assist the home health agency in developing the initial Plan of Care:    I certify that services are/were furnished while this patient was under the care of a physician and that a physician or an allowed non-physician practitioner (NPP), had a face-to-face encounter that meets the physician face-to-face encounter requirements. The encounter was in whole, or in part, related to the primary reason for home health. The patient is confined to his/her home and needs intermittent skilled nursing, physical therapy, speech-language pathology, or the continued need for occupational therapy. A plan of care has been established by a physician and is periodically reviewed by a physician.                   "

## 2021-06-15 NOTE — PROGRESS NOTES
[facility]    DATE: 2018    NAME:  Jennifer Ayala             :  1955  MRN: [id number]  CODE STATUS:  FULL CODE    VISIT TYPE: Problem Visit (insomnia, nocturia)     FACILITY:  [facility] [[id number]]       CHIEF COMPLAIN/REASON FOR VISIT:    Chief Complaint   Patient presents with     Problem Visit     insomnia, nocturia               HISTORY OF PRESENT ILLNESS: Jennifer Ayala is a 62 y.o. female who was admitted to [facility] on  for Fall down stairs, contusion of scalp, closed displaced fracture of acromial end of right clavicle, and left elbow fracture. She underwent ORIF of Left elbow and Left clavicle. On admission she was noted to have influenza A and completed tamiflu. Symptoms and precautions resolved prior to discharge. She was also treated for hypomagnesemia, hyponatremia during her stay. She has PMH of CVA with Right sided weakness, Hypothyroid, HTN. Prior to this she lived at home with her .     Today Ms. Ayala states she is doing very well and still has no pain. She says she has not even asked for tylenol. She would be fine with stopping the oxycodone since she is not using it. She says therapy is going well and her bowels are moving regularly. She was having issues with not sleeping and urinating a lot at night but it was a little better last night. She says her appetite is good. She does not have any other concerns at this time.     REVIEW OF SYSTEMS:  PROBLEMS AND REVIEW OF SYSTEMS:   Review of Systems  Today on ROS;   Currently, no fever, chills, or rigors. Does not have any visual or hearing problems. Denies any chest pain, headaches, palpitations, lightheadedness, dizziness, shortness of breath, or cough. Appetite is good. Denies any GERD symptoms. Denies any difficulty with swallowing, nausea, or vomiting.  Denies any abdominal pain, diarrhea or constipation. No active bleeding. No rash. Positive for minimal edema  Left arm/hand, Right sided weakness. Positive for urinary incontinence, nocturia, insomnia.       Allergies   Allergen Reactions     Other Drug Allergy (See Comments)      Anticoagulant Compound       Current Outpatient Prescriptions   Medication Sig     acetaminophen (TYLENOL) 500 MG tablet Take 500-1,000 mg by mouth every 6 (six) hours as needed for pain.     amLODIPine (NORVASC) 2.5 MG tablet Take 2.5 mg by mouth daily.     aspirin 81 MG EC tablet Take 81 mg by mouth daily.     calcium carbonate-vitamin D2 500 mg(1,250mg) -200 unit tablet Take 1 tablet by mouth 2 (two) times a day.     cholecalciferol, vitamin D3, 1,000 unit tablet Take 1,000 Units by mouth daily.     levothyroxine (SYNTHROID, LEVOTHROID) 25 MCG tablet Take 25 mcg by mouth daily.     lisinopril (PRINIVIL,ZESTRIL) 40 MG tablet Take 40 mg by mouth daily.     oxyCODONE (OXY-IR) 5 mg capsule Take 5 mg by mouth every 6 (six) hours as needed.     senna-docusate (PERICOLACE) 8.6-50 mg tablet Take 2 tablets by mouth 2 (two) times a day as needed for constipation.     sodium chloride 1 gram tablet Take 2 g by mouth every morning. 2 g every afternoon and 1 g every evening     Past Medical History:    Past Medical History:   Diagnosis Date     Anemia      Cerebral amyloid angiopathy      Closed displaced fracture of lateral end of right clavicle      Elbow fracture, left      HLD (hyperlipidemia)      Hypertension      Hypomagnesemia      Hyponatremia      Hypothyroidism      Influenza A      Osteoporosis      Right hemiparesis      Stroke      Tuberculosis            PHYSICAL EXAMINATION  Vitals:    01/10/18 2123   BP: 141/72   Pulse: 75   Resp: 18   Temp: 97.1  F (36.2  C)   SpO2: 95%         Today on physical exam:   GENERAL: Awake, Alert, oriented x3, cachectic, not in any form of acute distress, answers questions appropriately, follows simple commands, conversant  HEENT: Head is normocephalic with normal hair distribution. No evidence of trauma. Ears:  No acute purulent discharge. Eyes: Conjunctivae pink with no scleral jaundice. Nose: Normal mucosa and septum. NECK: Supple with no cervical or supraclavicular lymphadenopathy. Trachea is midline.   CHEST: No tenderness or deformity, no crepitus  LUNG: Dim to auscultation with good chest expansion. There are no crackles or wheezes, normal AP diameter.  BACK: No kyphosis of the thoracic spine. Symmetric, no curvature, ROM normal, no CVA tenderness, no spinal tenderness   CVS: There is good S1  S2, regular rhythm, there are no murmurs, rubs, gallops, or heaves,  2+ pulses symmetric in all extremities.  ABDOMEN: concave and soft, nontender to palpation, non distended, no masses, no organomegaly, good bowel sounds, no rebound or guarding, no peritoneal signs.   EXTREMITIES: No pedal edema, LUE sling in place, dressing over clavicle and elbow c/d/i, no drainage noted, minimal edema to shoulder, elbow, trace edema in Right hand, no numb/tingling, cap refill <2 sec;   BLE no cyanosis or clubbing, no calf tenderness.    SKIN: Warm and dry, no erythema noted.  Small contusion on scalp, scabbed and healing.   NEUROLOGICAL: The patient is oriented to person, place and time. No facial droop noted, delayed verbal response, Right sided hemiparesis            LABS:   Recent Results (from the past 168 hour(s))   Basic Metabolic Panel   Result Value Ref Range    Sodium 131 (L) 136 - 145 mmol/L    Potassium 4.4 3.5 - 5.0 mmol/L    Chloride 101 98 - 107 mmol/L    CO2 24 22 - 31 mmol/L    Anion Gap, Calculation 6 5 - 18 mmol/L    Glucose 86 70 - 125 mg/dL    Calcium 8.5 8.5 - 10.5 mg/dL    BUN 12 8 - 22 mg/dL    Creatinine 0.50 (L) 0.60 - 1.10 mg/dL    GFR MDRD Af Amer >60 >60 mL/min/1.73m2    GFR MDRD Non Af Amer >60 >60 mL/min/1.73m2   Magnesium   Result Value Ref Range    Magnesium 1.9 1.8 - 2.6 mg/dL   HM1 (CBC with Diff)   Result Value Ref Range    WBC 8.5 4.0 - 11.0 thou/uL    RBC 3.73 (L) 3.80 - 5.40 mill/uL    Hemoglobin 11.9  (L) 12.0 - 16.0 g/dL    Hematocrit 35.9 35.0 - 47.0 %    MCV 96 80 - 100 fL    MCH 31.9 27.0 - 34.0 pg    MCHC 33.1 32.0 - 36.0 g/dL    RDW 13.6 11.0 - 14.5 %    Platelets 431 140 - 440 thou/uL    MPV 10.0 8.5 - 12.5 fL    Neutrophils % 79 (H) 50 - 70 %    Lymphocytes % 11 (L) 20 - 40 %    Monocytes % 8 2 - 10 %    Eosinophils % 1 0 - 6 %    Basophils % 1 0 - 2 %    Neutrophils Absolute 6.4 2.0 - 7.7 thou/uL    Lymphocytes Absolute 1.0 0.8 - 4.4 thou/uL    Monocytes Absolute 0.7 0.0 - 0.9 thou/uL    Eosinophils Absolute 0.1 0.0 - 0.4 thou/uL    Basophils Absolute 0.0 0.0 - 0.2 thou/uL   Urinalysis   Result Value Ref Range    Color, UA Yellow Colorless, Yellow, Straw, Light Yellow    Clarity, UA Clear Clear    Glucose, UA Negative Negative    Bilirubin, UA Negative Negative    Ketones, UA Negative Negative    Specific Gravity, UA 1.008 1.001 - 1.030    Blood, UA Negative Negative    pH, UA 7.5 4.5 - 8.0    Protein, UA Negative Negative mg/dL    Urobilinogen, UA <2.0 E.U./dL <2.0 E.U./dL, 2.0 E.U./dL    Nitrite, UA Negative Negative    Leukocytes, UA Negative Negative   Culture, Urine   Result Value Ref Range    Culture No Growth      Results for orders placed or performed in visit on 01/04/18   Basic Metabolic Panel   Result Value Ref Range    Sodium 131 (L) 136 - 145 mmol/L    Potassium 4.4 3.5 - 5.0 mmol/L    Chloride 101 98 - 107 mmol/L    CO2 24 22 - 31 mmol/L    Anion Gap, Calculation 6 5 - 18 mmol/L    Glucose 86 70 - 125 mg/dL    Calcium 8.5 8.5 - 10.5 mg/dL    BUN 12 8 - 22 mg/dL    Creatinine 0.50 (L) 0.60 - 1.10 mg/dL    GFR MDRD Af Amer >60 >60 mL/min/1.73m2    GFR MDRD Non Af Amer >60 >60 mL/min/1.73m2         Lab Results   Component Value Date    WBC 8.5 01/04/2018    HGB 11.9 (L) 01/04/2018    HCT 35.9 01/04/2018    MCV 96 01/04/2018     01/04/2018       No results found for: [id number]  No results found for: HGBA1C  No results found for: INR, PROTIME  No results found for: [id number]  No  results found for: TSH        ASSESSMENT/PLAN:    1. Fall, clavicle and elbow fracture, s/p ORIF: Incision clavicle c/d/i, incision elbow c/d/i. Sling in place, remains NWB LUE.  PT, OT. F/u with ortho 1/8-saw in facility, ordered xrays of elbow and shoulder, all xray results reviewed and stable. She will f/u with Dr. Cota in 4 weeks. active ROM on Left hand, wrist, elbow, shoulder but no weight bearing still. She now has her removable arm splint. She has not had pain and not even asking for tylenol. Will dc oxycodone. Tylenol prn, nursing to notify if pain becomes worse. Per spouse and pt she has very high tolerance and sensitive to narcotics.   2. Cerebral Amyloid angiopathy, h/o CVA: Right sided hemiparesis, delayed verbal responses. Next f/u with neurology in April. On aspirin.   3. HTN: SBP 140s. On amlodipine, lisinopril. Hold parameters.   4. Hypothyroid: On levothyroxine.  5. Anorexia, malnutrition: Dietary made supplement recs, following closely. Wt 92lbs.   6. Hyponatremia: On NaCl supplement, recheck 1/4. Na 131 on 12/31, Na 131 on 1/4 stable.   7. Hypomagnesemia: Mg 1.5 on 12/31. On mag ox. Recheck 1/4-1.9. Stable.  8. Insomnia: Started melatonin qhs. Improved last night after first dose.   9. Constipation: Senna-docusate prn. No current issues.    10. Overactive bladder: Nocturia, urinary urgency. UA was negative. Started oxybutynin 5mg qhs. Pt stated was mildly improved last night after first dose. Will monitor.       Per therapy mod assist for transfers, max assist for dressing, assist of 2 to pivot and transfer. Recommending firm discharge date of 1/17.     Electronically signed by: Romina Narvaez    Total 35 minutes of which 75% was spent in counseling and coordination of care of the above plan    Time spent in interview and examination of patient, review of available records, and discussion with nursing staff. Continue care plan, efforts at therapy, and monitor nutritional  status.

## 2021-06-15 NOTE — PROGRESS NOTES
[facility]    DATE: 2018    NAME:  Jennifer Ayala             :  1955  MRN: [id number]  CODE STATUS:  FULL CODE    VISIT TYPE: Review Of Multiple Medical Conditions     FACILITY:  [facility] [[id number]]       CHIEF COMPLAIN/REASON FOR VISIT:    Chief Complaint   Patient presents with     Review Of Multiple Medical Conditions               HISTORY OF PRESENT ILLNESS: Jennifer Ayala is a 62 y.o. female who was admitted to [facility] on  for Fall down stairs, contusion of scalp, closed displaced fracture of acromial end of right clavicle, and left elbow fracture. She underwent ORIF of Left elbow and Left clavicle. On admission she was noted to have influenza A and completed tamiflu. Symptoms and precautions resolved prior to discharge. She was also treated for hypomagnesemia, hyponatremia during her stay. She has PMH of CVA with Right sided weakness, Hypothyroid, HTN. Prior to this she lived at home with her .     Today Ms. Ayala states she is still not having much pain. She says therapy is going ok but she can't do much still. She says she is sleeping better. She still urinates frequently at night but it is tolerable. She is still able to sleep pretty well. She says her appetite is good and she is not having any stomach issues. She says overall she is doing well and no new concerns. She denies numbness/tingling in her left arm/fingers. Per nursing no new concerns at this time.     REVIEW OF SYSTEMS:  PROBLEMS AND REVIEW OF SYSTEMS:   Review of Systems  Today on ROS;   Currently, no fever, chills, or rigors. Does not have any visual or hearing problems. Denies any chest pain, headaches, palpitations, lightheadedness, dizziness, shortness of breath, or cough. Appetite is good. Denies any GERD symptoms. Denies any difficulty with swallowing, nausea, or vomiting.  Denies any abdominal pain, diarrhea or constipation. No active bleeding. No  rash. Positive for minimal edema Left arm/hand, Right sided weakness. Positive for urinary incontinence, nocturia.       Allergies   Allergen Reactions     Other Drug Allergy (See Comments)      Anticoagulant Compound       Current Outpatient Prescriptions   Medication Sig     acetaminophen (TYLENOL) 500 MG tablet Take 500-1,000 mg by mouth every 6 (six) hours as needed for pain.     amLODIPine (NORVASC) 2.5 MG tablet Take 2.5 mg by mouth daily.     aspirin 81 MG EC tablet Take 81 mg by mouth daily.     calcium carbonate-vitamin D2 500 mg(1,250mg) -200 unit tablet Take 1 tablet by mouth 2 (two) times a day.     cholecalciferol, vitamin D3, 1,000 unit tablet Take 1,000 Units by mouth daily.     levothyroxine (SYNTHROID, LEVOTHROID) 25 MCG tablet Take 25 mcg by mouth daily.     lisinopril (PRINIVIL,ZESTRIL) 40 MG tablet Take 40 mg by mouth daily.     oxyCODONE (OXY-IR) 5 mg capsule Take 5 mg by mouth every 6 (six) hours as needed.     senna-docusate (PERICOLACE) 8.6-50 mg tablet Take 2 tablets by mouth 2 (two) times a day as needed for constipation.     sodium chloride 1 gram tablet Take 1 g by mouth 2 (two) times a day.     Past Medical History:    Past Medical History:   Diagnosis Date     Anemia      Cerebral amyloid angiopathy      Closed displaced fracture of lateral end of right clavicle      Elbow fracture, left      HLD (hyperlipidemia)      Hypertension      Hypomagnesemia      Hyponatremia      Hypothyroidism      Influenza A      Osteoporosis      Right hemiparesis      Stroke      Tuberculosis            PHYSICAL EXAMINATION  Vitals:    01/22/18 2054   BP: 104/67   Pulse: 61   Resp: 16   Temp: 97.9  F (36.6  C)   SpO2: 98%         Today on physical exam:   GENERAL: Awake, Alert, oriented x3, cachectic, not in any form of acute distress, answers questions appropriately, follows simple commands, conversant  HEENT: Head is normocephalic with normal hair distribution. No evidence of trauma. Ears: No acute  purulent discharge. Eyes: Conjunctivae pink with no scleral jaundice. Nose: Normal mucosa and septum. NECK: Supple with no cervical or supraclavicular lymphadenopathy. Trachea is midline.   CHEST: No tenderness or deformity, no crepitus  LUNG: Dim to auscultation with good chest expansion. There are no crackles or wheezes, normal AP diameter.  BACK: No kyphosis of the thoracic spine. Symmetric, no curvature, ROM normal, no CVA tenderness, no spinal tenderness   CVS: There is good S1  S2, regular rhythm, there are no murmurs, rubs, gallops, or heaves,  2+ pulses symmetric in all extremities.  ABDOMEN: concave and soft, nontender to palpation, non distended, no masses, no organomegaly, good bowel sounds, no rebound or guarding, no peritoneal signs.   EXTREMITIES: No pedal edema, LUE sling in place, dressing over clavicle and elbow c/d/i, no drainage noted, minimal edema to shoulder, elbow, trace edema in Right hand, no numb/tingling, cap refill <2 sec; Incision shoulder and elbow c/d/i  BLE no cyanosis or clubbing, no calf tenderness.    SKIN: Warm and dry, no erythema noted.  Small contusion on scalp, scabbed and healing.   NEUROLOGICAL: The patient is oriented to person, place and time. No facial droop noted, delayed verbal response, Right sided hemiparesis            LABS:   Recent Results (from the past 168 hour(s))   Basic Metabolic Panel   Result Value Ref Range    Sodium 129 (L) 136 - 145 mmol/L    Potassium 4.4 3.5 - 5.0 mmol/L    Chloride 99 98 - 107 mmol/L    CO2 25 22 - 31 mmol/L    Anion Gap, Calculation 5 5 - 18 mmol/L    Glucose 86 70 - 125 mg/dL    Calcium 8.6 8.5 - 10.5 mg/dL    BUN 16 8 - 22 mg/dL    Creatinine 0.61 0.60 - 1.10 mg/dL    GFR MDRD Af Amer >60 >60 mL/min/1.73m2    GFR MDRD Non Af Amer >60 >60 mL/min/1.73m2     Results for orders placed or performed in visit on 01/18/18   Basic Metabolic Panel   Result Value Ref Range    Sodium 129 (L) 136 - 145 mmol/L    Potassium 4.4 3.5 - 5.0 mmol/L     Chloride 99 98 - 107 mmol/L    CO2 25 22 - 31 mmol/L    Anion Gap, Calculation 5 5 - 18 mmol/L    Glucose 86 70 - 125 mg/dL    Calcium 8.6 8.5 - 10.5 mg/dL    BUN 16 8 - 22 mg/dL    Creatinine 0.61 0.60 - 1.10 mg/dL    GFR MDRD Af Amer >60 >60 mL/min/1.73m2    GFR MDRD Non Af Amer >60 >60 mL/min/1.73m2         Lab Results   Component Value Date    WBC 8.5 01/04/2018    HGB 11.9 (L) 01/04/2018    HCT 35.9 01/04/2018    MCV 96 01/04/2018     01/04/2018       No results found for: [id number]  No results found for: HGBA1C  No results found for: INR, PROTIME  Vitamin D, Total (25-Hydroxy)   Date Value Ref Range Status   01/11/2018 57.5 30.0 - 80.0 ng/mL Final     Lab Results   Component Value Date    TSH 1.36 01/11/2018           ASSESSMENT/PLAN:    1. Fall, clavicle and elbow fracture, s/p ORIF: Incision clavicle c/d/i, incision elbow c/d/i. Sling in place, remains NWB LUE.  PT, OT. F/u with ortho 1/8-saw in facility, ordered xrays of elbow and shoulder, all xray results reviewed and stable. She will f/u with Dr. Cota on 2/6,. active ROM on Left hand, wrist, elbow, shoulder but no weight bearing still. She now has her removable arm splint. She has not had pain and not even asking for tylenol. Stopped oxycodone. Tylenol prn. Per spouse and pt she has very high tolerance and sensitive to narcotics. Rarely using tylenol at this time.   2. Cerebral Amyloid angiopathy, h/o CVA: Right sided hemiparesis, delayed verbal responses. Next f/u with neurology on 4/3. On aspirin.   3. HTN: SBP 140s. On amlodipine, lisinopril. Hold parameters.   4. Hypothyroid: On levothyroxine.  5. Anorexia, malnutrition: Dietary made supplement recs, following closely. Wt 92-92.8lbs.   6. Hyponatremia: On NaCl supplement, recheck 1/18 129. Recheck 1/23.   7. Hypomagnesemia: Mg 1.5 on 12/31. On mag ox. Recheck 1/4-1.9. Stable.  8. Insomnia: On melatonin qhs. Improved.  9. Constipation: Senna-docusate prn. No current issues.    10.  Overactive bladder: Nocturia, urinary urgency. UA was negative. On oxybutynin 5mg qhs. Improved more tolerable per pt.       Per therapy mod assist for transfers, max assist for dressing, assist of 2 to pivot and transfer. Continue to monitor progress.     Electronically signed by: Romina Narvaez NP    Total 25 minutes of which 75% was spent in counseling and coordination of care of the above plan    Time spent in interview and examination of patient, review of available records, and discussion with nursing staff. Continue care plan, efforts at therapy, and monitor nutritional status.

## 2021-06-15 NOTE — PROGRESS NOTES
[facility] [place] [facility]    DATE: 2018    NAME:  Jennifer Ayala             :  1955  MRN: [id number]  CODE STATUS:  FULL CODE    VISIT TYPE: Review Of Multiple Medical Conditions     FACILITY:  [facility] [[id number]]       CHIEF COMPLAIN/REASON FOR VISIT:    Chief Complaint   Patient presents with     Review Of Multiple Medical Conditions               HISTORY OF PRESENT ILLNESS: Jennifer Ayala is a 62 y.o. female who was admitted to [facility] on  for Fall down stairs, contusion of scalp, closed displaced fracture of acromial end of right clavicle, and left elbow fracture. She underwent ORIF of Left elbow and Left clavicle. On admission she was noted to have influenza A and completed tamiflu. Symptoms and precautions resolved prior to discharge. She was also treated for hypomagnesemia, hyponatremia during her stay. She has PMH of CVA with Right sided weakness, Hypothyroid, HTN. Prior to this she lived at home with her .     Today Ms. Ayala states she is doing well. She is not having much pain and not asking for pain meds very often. She says she is sleeping well and she got her new removable brace on her arm. She saw the ortho Nurse practitioner yesterday who ordered xrays but she has not had them done yet. She says she is able to do some range of motion with her brace but no weight bearing yet. Her bowels are moving ok and her appetite is fine. She slept pretty well last night. She is not coughing or having shortness of breath. She denies any new concerns today.     REVIEW OF SYSTEMS:  PROBLEMS AND REVIEW OF SYSTEMS:   Review of Systems  Today on ROS;   Currently, no fever, chills, or rigors. Does not have any visual or hearing problems. Denies any chest pain, headaches, palpitations, lightheadedness, dizziness, shortness of breath, or cough. Appetite is good. Denies any GERD symptoms. Denies any difficulty with swallowing, nausea, or vomiting.   Denies any abdominal pain, diarrhea or constipation. No insomnia. No active bleeding. No rash. Positive for minimal edema Left arm/hand, Right sided weakness. Positive for urinary incontinence.       Allergies   Allergen Reactions     Other Drug Allergy (See Comments)      Anticoagulant Compound       Current Outpatient Prescriptions   Medication Sig     acetaminophen (TYLENOL) 500 MG tablet Take 500-1,000 mg by mouth every 6 (six) hours as needed for pain.     amLODIPine (NORVASC) 2.5 MG tablet Take 2.5 mg by mouth daily.     aspirin 81 MG EC tablet Take 81 mg by mouth daily.     calcium carbonate-vitamin D2 500 mg(1,250mg) -200 unit tablet Take 1 tablet by mouth 2 (two) times a day.     cholecalciferol, vitamin D3, 1,000 unit tablet Take 1,000 Units by mouth daily.     levothyroxine (SYNTHROID, LEVOTHROID) 25 MCG tablet Take 25 mcg by mouth daily.     lisinopril (PRINIVIL,ZESTRIL) 40 MG tablet Take 40 mg by mouth daily.     oxyCODONE (OXY-IR) 5 mg capsule Take 5 mg by mouth every 6 (six) hours as needed.     senna-docusate (PERICOLACE) 8.6-50 mg tablet Take 2 tablets by mouth 2 (two) times a day as needed for constipation.     sodium chloride 1 gram tablet Take 2 g by mouth every morning. 2 g every afternoon and 1 g every evening     Past Medical History:    Past Medical History:   Diagnosis Date     Anemia      Cerebral amyloid angiopathy      Closed displaced fracture of lateral end of right clavicle      Elbow fracture, left      HLD (hyperlipidemia)      Hypertension      Hypomagnesemia      Hyponatremia      Hypothyroidism      Influenza A      Osteoporosis      Right hemiparesis      Stroke      Tuberculosis            PHYSICAL EXAMINATION  Vitals:    01/08/18 2218   BP: 115/81   Pulse: 61   Resp: 18   Temp: 98.1  F (36.7  C)   SpO2: 97%         Today on physical exam:   GENERAL: Awake, Alert, oriented x3, cachectic, not in any form of acute distress, answers questions appropriately, follows simple commands,  conversant  HEENT: Head is normocephalic with normal hair distribution. No evidence of trauma. Ears: No acute purulent discharge. Eyes: Conjunctivae pink with no scleral jaundice. Nose: Normal mucosa and septum. NECK: Supple with no cervical or supraclavicular lymphadenopathy. Trachea is midline.   CHEST: No tenderness or deformity, no crepitus  LUNG: Dim to auscultation with good chest expansion. There are no crackles or wheezes, normal AP diameter.  BACK: No kyphosis of the thoracic spine. Symmetric, no curvature, ROM normal, no CVA tenderness, no spinal tenderness   CVS: There is good S1  S2, regular rhythm, there are no murmurs, rubs, gallops, or heaves,  2+ pulses symmetric in all extremities.  ABDOMEN: concave and soft, nontender to palpation, non distended, no masses, no organomegaly, good bowel sounds, no rebound or guarding, no peritoneal signs.   EXTREMITIES: No pedal edema, LUE sling in place, dressing over clavicle and elbow c/d/i, no drainage noted, minimal edema to shoulder, elbow, trace edema in Right hand, no numb/tingling, cap refill <2 sec;   BLE no cyanosis or clubbing, no calf tenderness.    SKIN: Warm and dry, no erythema noted.  Small contusion on scalp, scabbed and healing.   NEUROLOGICAL: The patient is oriented to person, place and time. No facial droop noted, delayed verbal response, Right sided hemiparesis            LABS:   Recent Results (from the past 168 hour(s))   Basic Metabolic Panel   Result Value Ref Range    Sodium 131 (L) 136 - 145 mmol/L    Potassium 4.4 3.5 - 5.0 mmol/L    Chloride 101 98 - 107 mmol/L    CO2 24 22 - 31 mmol/L    Anion Gap, Calculation 6 5 - 18 mmol/L    Glucose 86 70 - 125 mg/dL    Calcium 8.5 8.5 - 10.5 mg/dL    BUN 12 8 - 22 mg/dL    Creatinine 0.50 (L) 0.60 - 1.10 mg/dL    GFR MDRD Af Amer >60 >60 mL/min/1.73m2    GFR MDRD Non Af Amer >60 >60 mL/min/1.73m2   Magnesium   Result Value Ref Range    Magnesium 1.9 1.8 - 2.6 mg/dL   HM1 (CBC with Diff)   Result  Value Ref Range    WBC 8.5 4.0 - 11.0 thou/uL    RBC 3.73 (L) 3.80 - 5.40 mill/uL    Hemoglobin 11.9 (L) 12.0 - 16.0 g/dL    Hematocrit 35.9 35.0 - 47.0 %    MCV 96 80 - 100 fL    MCH 31.9 27.0 - 34.0 pg    MCHC 33.1 32.0 - 36.0 g/dL    RDW 13.6 11.0 - 14.5 %    Platelets 431 140 - 440 thou/uL    MPV 10.0 8.5 - 12.5 fL    Neutrophils % 79 (H) 50 - 70 %    Lymphocytes % 11 (L) 20 - 40 %    Monocytes % 8 2 - 10 %    Eosinophils % 1 0 - 6 %    Basophils % 1 0 - 2 %    Neutrophils Absolute 6.4 2.0 - 7.7 thou/uL    Lymphocytes Absolute 1.0 0.8 - 4.4 thou/uL    Monocytes Absolute 0.7 0.0 - 0.9 thou/uL    Eosinophils Absolute 0.1 0.0 - 0.4 thou/uL    Basophils Absolute 0.0 0.0 - 0.2 thou/uL     Results for orders placed or performed in visit on 01/04/18   Basic Metabolic Panel   Result Value Ref Range    Sodium 131 (L) 136 - 145 mmol/L    Potassium 4.4 3.5 - 5.0 mmol/L    Chloride 101 98 - 107 mmol/L    CO2 24 22 - 31 mmol/L    Anion Gap, Calculation 6 5 - 18 mmol/L    Glucose 86 70 - 125 mg/dL    Calcium 8.5 8.5 - 10.5 mg/dL    BUN 12 8 - 22 mg/dL    Creatinine 0.50 (L) 0.60 - 1.10 mg/dL    GFR MDRD Af Amer >60 >60 mL/min/1.73m2    GFR MDRD Non Af Amer >60 >60 mL/min/1.73m2         Lab Results   Component Value Date    WBC 8.5 01/04/2018    HGB 11.9 (L) 01/04/2018    HCT 35.9 01/04/2018    MCV 96 01/04/2018     01/04/2018       No results found for: [id number]  No results found for: HGBA1C  No results found for: INR, PROTIME  No results found for: MFPHFWXT28AF  No results found for: TSH        ASSESSMENT/PLAN:    1. Fall, clavicle and elbow fracture, s/p ORIF: Incision clavicle c/d/i, incision elbow c/d/i. Sling in place, remains NWB LUE.  PT, OT. F/u with ortho 1/8-saw in facility, ordered xrays of elbow and shoulder, to be done today in facility. She will f/u with Dr. Cota in 4 weeks. Now can do active ROM on Left hand, wrist, elbow, shoulder but no weight bearing still. She now has her removable arm splint.    2. Cerebral Amyloid angiopathy, h/o CVA: Right sided hemiparesis, delayed verbal responses. Next f/u with neurology in April. On aspirin.   3. HTN: SBP 110s. On amlodipine, lisinopril. Hold parameters.   4. Hypothyroid: On levothyroxine.  5. Anorexia, malnutrition: Ordered dietary consult. Wt 92lbs.   6. Hyponatremia: On NaCl supplement, recheck 1/4. Na 131 on 12/31, Na 131 on 1/4 stable.   7. Hypomagnesemia: Mg 1.5 on 12/31. On mag ox. Recheck 1/4-1.9. Stable.  8. Hypokalemia: K 3.2 on 12/31. No potassium supplement with dc orders, will recheck 1/4-4.4 so resolved. No need for further supplement or monitoring.   9. Constipation: Senna-docusate prn. No current issues.       Per therapy mod assist for transfers, max assist for dressing, assist of 2 to pivot and transfer. Recommending soft discharge date of 1/17.     Electronically signed by: Romina Narvaez    Total 25 minutes of which 75% was spent in counseling and coordination of care of the above plan    Time spent in interview and examination of patient, review of available records, and discussion with nursing staff. Continue care plan, efforts at therapy, and monitor nutritional status.

## 2021-07-03 NOTE — ADDENDUM NOTE
Addendum Note by Henrietta Quinn CRNA at 9/20/2019  2:49 PM     Author: Henrietta Quinn CRNA Service: -- Author Type: Nurse Anesthetist    Filed: 9/20/2019  2:49 PM Date of Service: 9/20/2019  2:49 PM Status: Signed    : Henrietta Quinn CRNA (Nurse Anesthetist)       Addendum  created 09/20/19 1449 by Henrietta Quinn CRNA    Intraprocedure Event edited, Intraprocedure Flowsheets edited